# Patient Record
Sex: FEMALE | Race: WHITE | Employment: OTHER | URBAN - METROPOLITAN AREA
[De-identification: names, ages, dates, MRNs, and addresses within clinical notes are randomized per-mention and may not be internally consistent; named-entity substitution may affect disease eponyms.]

---

## 2017-01-09 ENCOUNTER — TRANSFERRED RECORDS (OUTPATIENT)
Dept: HEALTH INFORMATION MANAGEMENT | Facility: CLINIC | Age: 72
End: 2017-01-09

## 2017-02-22 ENCOUNTER — TRANSFERRED RECORDS (OUTPATIENT)
Dept: HEALTH INFORMATION MANAGEMENT | Facility: CLINIC | Age: 72
End: 2017-02-22

## 2017-02-22 LAB
ALT SERPL-CCNC: 31 U/L (ref 13–61)
AST SERPL-CCNC: 20 U/L (ref 5–37)
CHOLEST SERPL-MCNC: 122 MG/DL (ref 0–200)
CREAT SERPL-MCNC: 0.6 MG/DL (ref 0.6–1)
GFR SERPL CREATININE-BSD FRML MDRD: >=60 ML/MIN/1.73M2
GLUCOSE SERPL-MCNC: 104 MG/DL (ref 70–100)
HDLC SERPL-MCNC: 47 MG/DL (ref 40–60)
LDLC SERPL CALC-MCNC: 41 MG/DL (ref 0–99)
POTASSIUM SERPL-SCNC: 3.9 MMOL/L (ref 3.5–5.1)
TRIGL SERPL-MCNC: 168 MG/DL (ref 60–149)
TSH SERPL-ACNC: 2.99 UIU/ML (ref 0.36–4.7)

## 2017-03-01 ENCOUNTER — TRANSFERRED RECORDS (OUTPATIENT)
Dept: HEALTH INFORMATION MANAGEMENT | Facility: CLINIC | Age: 72
End: 2017-03-01

## 2017-06-01 ENCOUNTER — OFFICE VISIT (OUTPATIENT)
Dept: FAMILY MEDICINE | Facility: CLINIC | Age: 72
End: 2017-06-01
Payer: MEDICARE

## 2017-06-01 VITALS
HEIGHT: 63 IN | WEIGHT: 172 LBS | TEMPERATURE: 98 F | BODY MASS INDEX: 30.48 KG/M2 | HEART RATE: 62 BPM | SYSTOLIC BLOOD PRESSURE: 128 MMHG | OXYGEN SATURATION: 97 % | DIASTOLIC BLOOD PRESSURE: 75 MMHG

## 2017-06-01 DIAGNOSIS — I10 HYPERTENSION GOAL BP (BLOOD PRESSURE) < 140/90: ICD-10-CM

## 2017-06-01 DIAGNOSIS — J31.0 CHRONIC RHINITIS: ICD-10-CM

## 2017-06-01 DIAGNOSIS — F33.41 RECURRENT MAJOR DEPRESSION IN PARTIAL REMISSION (H): ICD-10-CM

## 2017-06-01 DIAGNOSIS — K21.9 GASTROESOPHAGEAL REFLUX DISEASE WITHOUT ESOPHAGITIS: ICD-10-CM

## 2017-06-01 DIAGNOSIS — E03.9 HYPOTHYROIDISM, UNSPECIFIED TYPE: ICD-10-CM

## 2017-06-01 DIAGNOSIS — Z00.00 MEDICARE ANNUAL WELLNESS VISIT, SUBSEQUENT: Primary | ICD-10-CM

## 2017-06-01 PROCEDURE — G0439 PPPS, SUBSEQ VISIT: HCPCS | Performed by: FAMILY MEDICINE

## 2017-06-01 PROCEDURE — 99213 OFFICE O/P EST LOW 20 MIN: CPT | Mod: 25 | Performed by: FAMILY MEDICINE

## 2017-06-01 RX ORDER — LEVOTHYROXINE SODIUM 100 UG/1
100 TABLET ORAL DAILY
Qty: 90 TABLET | Refills: 2 | Status: SHIPPED | OUTPATIENT
Start: 2017-06-01 | End: 2018-06-22

## 2017-06-01 RX ORDER — FLUTICASONE PROPIONATE 50 MCG
1-2 SPRAY, SUSPENSION (ML) NASAL DAILY
Qty: 16 G | Refills: 11 | Status: SHIPPED | OUTPATIENT
Start: 2017-06-01 | End: 2018-09-25

## 2017-06-01 RX ORDER — CITALOPRAM HYDROBROMIDE 20 MG/1
20 TABLET ORAL DAILY
Qty: 90 TABLET | Refills: 3 | Status: CANCELLED | OUTPATIENT
Start: 2017-06-01

## 2017-06-01 RX ORDER — CITALOPRAM HYDROBROMIDE 10 MG/1
10 TABLET ORAL DAILY
Qty: 90 TABLET | Refills: 3 | Status: SHIPPED | OUTPATIENT
Start: 2017-06-01 | End: 2018-06-12

## 2017-06-01 NOTE — MR AVS SNAPSHOT
After Visit Summary   6/1/2017    Miladis Singh    MRN: [id number]           Patient Information     Date Of Birth          1945        Visit Information        Provider Department      6/1/2017 11:20 AM Aman Salamanca MD [facility]        Today's Diagnoses     Medicare annual wellness visit, subsequent    -  1    Hypothyroidism, unspecified type        Recurrent Major Depression in Partial Remission-Yudy Deactivated        Hypertension goal BP (blood pressure) < 140/90        Gastroesophageal reflux disease without esophagitis          Care Instructions      Preventive Health Recommendations    Female Ages 65 +    Yearly exam:     See your health care provider every year in order to  o Review health changes.   o Discuss preventive care.    o Review your medicines if your doctor has prescribed any.      You no longer need a yearly Pap test unless you've had an abnormal Pap test in the past 10 years. If you have vaginal symptoms, such as bleeding or discharge, be sure to talk with your provider about a Pap test.      Every 1 to 2 years, have a mammogram.  If you are over 69, talk with your health care provider about whether or not you want to continue having screening mammograms.      Every 10 years, have a colonoscopy. Or, have a yearly FIT test (stool test). These exams will check for colon cancer.       Have a cholesterol test every 5 years, or more often if your doctor advises it.       Have a diabetes test (fasting glucose) every three years. If you are at risk for diabetes, you should have this test more often.       At age 65, have a bone density scan (DEXA) to check for osteoporosis (brittle bone disease).    Shots:    Get a flu shot each year.    Get a tetanus shot every 10 years.    Talk to your doctor about your pneumonia vaccines. There are now two you should receive - Pneumovax (PPSV 23) and Prevnar (PCV 13).    Talk to your doctor about the shingles  vaccine.    Talk to your doctor about the hepatitis B vaccine.    Nutrition:     Eat at least 5 servings of fruits and vegetables each day.      Eat whole-grain bread, whole-wheat pasta and brown rice instead of white grains and rice.      Talk to your provider about Calcium and Vitamin D.     Lifestyle    Exercise at least 150 minutes a week (30 minutes a day, 5 days a week). This will help you control your weight and prevent disease.      Limit alcohol to one drink per day.      No smoking.       Wear sunscreen to prevent skin cancer.       See your dentist twice a year for an exam and cleaning.      See your eye doctor every 1 to 2 years to screen for conditions such as glaucoma, macular degeneration and cataracts        If you want help to set up a health care directive. Set up an appt with Maureen Whelan.          Follow-ups after your visit        Your next 10 appointments already scheduled     Jun 01, 2017 11:20 AM CDT   PHYSICAL with Aman Salamanca MD   [facility]    [address] [place] [id number] [phone]              Who to contact     If you have questions or need follow up information about today's clinic visit or your schedule please contact [facility] directly at [phone].  Normal or non-critical lab and imaging results will be communicated to you by Ratifyhart, letter or phone within 4 business days after the clinic has received the results. If you do not hear from us within 7 days, please contact the clinic through Ratifyhart or phone. If you have a critical or abnormal lab result, we will notify you by phone as soon as possible.  Submit refill requests through [employer] or call your pharmacy and they will forward the refill request to us. Please allow 3 business days for your refill to be completed.          Additional Information About Your Visit        Frugalo Information     F[employer]alo gives you secure access to  "your electronic health record. If you see a primary care provider, you can also send messages to your care team and make appointments. If you have questions, please call your primary care clinic.  If you do not have a primary care provider, please call [phone] and they will assist you.        Care EveryWhere ID     This is your Care EveryWhere ID. This could be used by other organizations to access your [place] medical records  [id number]        Your Vitals Were     Pulse Temperature Height Pulse Oximetry BMI (Body Mass Index)       62 98  F (36.7  C) (Oral) 5' 2.5\" (1.588 m) 97% 30.96 kg/m2        Blood Pressure from Last 3 Encounters:   06/01/17 128/75   08/16/16 104/68   08/15/16 124/83    Weight from Last 3 Encounters:   06/01/17 172 lb (78 kg)   08/16/16 162 lb (73.5 kg)   08/15/16 164 lb (74.4 kg)              Today, you had the following     No orders found for display         Today's Medication Changes          These changes are accurate as of: 6/1/17 11:18 AM.  If you have any questions, ask your nurse or doctor.               These medicines have changed or have updated prescriptions.        Dose/Directions    * citalopram 20 MG tablet   Commonly known as:  celeXA   This may have changed:  Another medication with the same name was added. Make sure you understand how and when to take each.   Used for:  Recurrent major depression in partial remission (H)   Changed by:  Aman Salamanca MD        Dose:  20 mg   Take 1 tablet (20 mg) by mouth daily   Quantity:  90 tablet   Refills:  3       * citalopram 10 MG tablet   Commonly known as:  celeXA   This may have changed:  You were already taking a medication with the same name, and this prescription was added. Make sure you understand how and when to take each.   Used for:  Recurrent major depression in partial remission (H)   Changed by:  Aman Salamanca MD        Dose:  10 mg   Take 1 tablet (10 mg) by mouth daily   Quantity:  90 tablet   Refills:  " 3       omeprazole 20 MG CR capsule   Commonly known as:  priLOSEC   This may have changed:    - when to take this  - reasons to take this   Used for:  Gastroesophageal reflux disease without esophagitis   Changed by:  Aman Salamanca MD        Dose:  20 mg   Take 1 capsule (20 mg) by mouth 2 times daily as needed   Quantity:  180 capsule   Refills:  3       * Notice:  This list has 2 medication(s) that are the same as other medications prescribed for you. Read the directions carefully, and ask your doctor or other care provider to review them with you.         Where to get your medicines      [facility] [address] [place] [address] [address] AT [place] [address] [address], [facility] [address]     Phone:  [phone]     citalopram 10 MG tablet    levothyroxine 100 MCG tablet    omeprazole 20 MG CR capsule                Primary Care Provider Office Phone # Fax #    Aman Salamanca [phone]       [address] [address]        Thank you!     Thank you for choosing [facility]  for your care. Our goal is always to provide you with excellent care. Hearing back from our patients is one way we can continue to improve our services. Please take a few minutes to complete the written survey that you may receive in the mail after your visit with us. Thank you!             Your Updated Medication List - Protect others around you: Learn how to safely use, store and throw away your medicines at www.disposemymeds.org.          This list is accurate as of: 6/1/17 11:18 AM.  Always use your most recent med list.                   Brand Name Dispense Instructions for use    atorvastatin 80 MG tablet    LIPITOR     Take 80 mg by mouth daily.       DOTTIE LOW STRENGTH 81 MG EC tablet   Generic drug:  aspirin      Take 81 mg by mouth daily.       chlorthalidone 25 MG tablet    HYGROTON     Take 25  mg by mouth daily.       * citalopram 20 MG tablet    celeXA    90 tablet    Take 1 tablet (20 mg) by mouth daily       * citalopram 10 MG tablet    celeXA    90 tablet    Take 1 tablet (10 mg) by mouth daily       conjugated estrogens cream    PREMARIN     Place 1 g vaginally three times a week       levothyroxine 100 MCG tablet    SYNTHROID/LEVOTHROID    90 tablet    Take 1 tablet (100 mcg) by mouth daily       losartan 100 MG tablet    COZAAR     Take 100 mg by mouth daily.       omeprazole 20 MG CR capsule    priLOSEC    180 capsule    Take 1 capsule (20 mg) by mouth 2 times daily as needed       potassium chloride 20 MEQ Packet    KLOR-CON     Take 20 mEq by mouth daily.       TGT DAILY MULTIVITAMIN WOMENS PO      Take  by mouth.       VIACTIV 500-100-40 chewable tablet   Generic drug:  calcium-vitamin D-vitamin k      Take 1 chew tab by mouth 2 times daily.       VITAMIN D (CHOLECALCIFEROL) PO      Take 2,000 Units by mouth daily       * Notice:  This list has 2 medication(s) that are the same as other medications prescribed for you. Read the directions carefully, and ask your doctor or other care provider to review them with you.

## 2017-06-01 NOTE — NURSING NOTE
"Chief Complaint   Patient presents with     Wellness Visit     Ear Problem     right ear pain and itching.        Initial /75  Pulse 62  Temp 98  F (36.7  C) (Oral)  Ht 5' 2.5\" (1.588 m)  Wt 172 lb (78 kg)  SpO2 97%  BMI 30.96 kg/m2 Estimated body mass index is 30.96 kg/(m^2) as calculated from the following:    Height as of this encounter: 5' 2.5\" (1.588 m).    Weight as of this encounter: 172 lb (78 kg).  Medication Reconciliation: complete     Yasir Richardson MA       "

## 2017-06-01 NOTE — Clinical Note
Please abstract the following data from this visit with this patient into the appropriate field in Epic:  Eye exam with ophthalmology on this date: 09/2016

## 2017-06-01 NOTE — PATIENT INSTRUCTIONS

## 2017-06-02 ASSESSMENT — PATIENT HEALTH QUESTIONNAIRE - PHQ9: SUM OF ALL RESPONSES TO PHQ QUESTIONS 1-9: 1

## 2017-09-07 ENCOUNTER — ALLIED HEALTH/NURSE VISIT (OUTPATIENT)
Dept: NURSING | Facility: CLINIC | Age: 72
End: 2017-09-07
Payer: MEDICARE

## 2017-09-07 DIAGNOSIS — Z23 NEED FOR PROPHYLACTIC VACCINATION AND INOCULATION AGAINST INFLUENZA: Primary | ICD-10-CM

## 2017-09-07 PROCEDURE — G0008 ADMIN INFLUENZA VIRUS VAC: HCPCS

## 2017-09-07 PROCEDURE — 99207 ZZC NO CHARGE NURSE ONLY: CPT

## 2017-09-07 PROCEDURE — 90662 IIV NO PRSV INCREASED AG IM: CPT

## 2017-09-07 NOTE — MR AVS SNAPSHOT
After Visit Summary   9/7/2017    Miladis Singh    MRN: [id number]           Patient Information     Date Of Birth          1945        Visit Information        Provider Department      9/7/2017 1:00 PM [facility]        Today's Diagnoses     Need for prophylactic vaccination and inoculation against influenza    -  1       Follow-ups after your visit        Who to contact     If you have questions or need follow up information about today's clinic visit or your schedule please contact [facility] directly at [phone].  Normal or non-critical lab and imaging results will be communicated to you by [employer] For Homehart, letter or phone within 4 business days after the clinic has received the results. If you do not hear from us within 7 days, please contact the clinic through [employer] or phone. If you have a critical or abnormal lab result, we will notify you by phone as soon as possible.  Submit refill requests through [employer] or call your pharmacy and they will forward the refill request to us. Please allow 3 business days for your refill to be completed.          Additional Information About Your Visit        MyChart Information     [employer] gives you secure access to your electronic health record. If you see a primary care provider, you can also send messages to your care team and make appointments. If you have questions, please call your primary care clinic.  If you do not have a primary care provider, please call [phone] and they will assist you.        Care EveryWhere ID     This is your Care EveryWhere ID. This could be used by other organizations to access your [place] medical records  [id number]         Blood Pressure from Last 3 Encounters:   06/01/17 128/75   08/16/16 104/68   08/15/16 124/83    Weight from Last 3 Encounters:   06/01/17 172 lb (78 kg)   08/16/16 162 lb (73.5 kg)   08/15/16 164 lb (74.4 kg)              We Performed  the Following     FLU VACCINE, INCREASED ANTIGEN, PRESV FREE, AGE 65+ [88834]     Vaccine Administration, Initial [[id number]]        Primary Care Provider Office Phone # Fax #    Aman Salamanca [phone] [address]  [facility] [address]        Equal Access to Services     RANULFO AVERY : Hadii aad ku hadasho Soomaali, waaxda luqadaha, qaybta kaalmada adeegyada, waxay vladimirin hayaan adeeduard germaniaventura neely. So [facility] [phone].    ATENCIÓN: Si habla español, tiene a alaniz disposición servicios gratuitos de asistencia lingüística. Llame al [phone].    We comply with applicable federal civil rights laws and [place] laws. We do not discriminate on the basis of race, color, national origin, age, disability sex, sexual orientation or gender identity.            Thank you!     Thank you for choosing [facility]  for your care. Our goal is always to provide you with excellent care. Hearing back from our patients is one way we can continue to improve our services. Please take a few minutes to complete the written survey that you may receive in the mail after your visit with us. Thank you!             Your Updated Medication List - Protect others around you: Learn how to safely use, store and throw away your medicines at www.disposemymeds.org.          This list is accurate as of: 9/7/17  1:32 PM.  Always use your most recent med list.                   Brand Name Dispense Instructions for use Diagnosis    atorvastatin 80 MG tablet    LIPITOR     Take 80 mg by mouth daily.        DOTTIE LOW STRENGTH 81 MG EC tablet   Generic drug:  aspirin      Take 81 mg by mouth daily.        chlorthalidone 25 MG tablet    HYGROTON     Take 25 mg by mouth daily.        * citalopram 20 MG tablet    celeXA    90 tablet    Take 1 tablet (20 mg) by mouth daily    Recurrent major depression in partial remission (H)       * citalopram 10 MG tablet    celeXA    90 tablet    Take 1 tablet (10 mg) by mouth  daily    Recurrent major depression in partial remission (H)       conjugated estrogens cream    PREMARIN     Place 1 g vaginally three times a week        fluticasone 50 MCG/ACT spray    FLONASE    16 g    Spray 1-2 sprays into both nostrils daily    Chronic rhinitis       levothyroxine 100 MCG tablet    SYNTHROID/LEVOTHROID    90 tablet    Take 1 tablet (100 mcg) by mouth daily    Hypothyroidism, unspecified type       losartan 100 MG tablet    COZAAR     Take 100 mg by mouth daily.        omeprazole 20 MG CR capsule    priLOSEC    180 capsule    Take 1 capsule (20 mg) by mouth 2 times daily as needed    Gastroesophageal reflux disease without esophagitis       potassium chloride 20 MEQ Packet    KLOR-CON     Take 20 mEq by mouth daily.        TGT DAILY MULTIVITAMIN WOMENS PO      Take  by mouth.        VIACTIV 500-100-40 chewable tablet   Generic drug:  calcium-vitamin D-vitamin k      Take 1 chew tab by mouth 2 times daily.        VITAMIN D (CHOLECALCIFEROL) PO      Take 2,000 Units by mouth daily        * Notice:  This list has 2 medication(s) that are the same as other medications prescribed for you. Read the directions carefully, and ask your doctor or other care provider to review them with you.

## 2017-09-07 NOTE — PROGRESS NOTES
Injectable Influenza Immunization Documentation    1.  Are you sick today? (Fever of 100.5 or higher on the day of the clinic)   No    2.  Have you ever had Guillain-[place] Syndrome within 6 weeks of an influenza vaccionation?  No    3. Do you have a life-threatening allergy to eggs?  No    4. Do you have a life-threatening allergy to a component of the vaccine? May include antibiotics, gelatin or latex.  No     5. Have you ever had a reaction to a dose of flu vaccine that needed immediate medical attention?  No     Form completed by Chato Quevedo MA

## 2017-12-04 ENCOUNTER — TRANSFERRED RECORDS (OUTPATIENT)
Dept: HEALTH INFORMATION MANAGEMENT | Facility: CLINIC | Age: 72
End: 2017-12-04

## 2018-03-05 ENCOUNTER — TRANSFERRED RECORDS (OUTPATIENT)
Dept: HEALTH INFORMATION MANAGEMENT | Facility: CLINIC | Age: 73
End: 2018-03-05

## 2018-03-05 LAB
ALT SERPL-CCNC: 37 U/L (ref 13–61)
AST SERPL-CCNC: 25 U/L (ref 15–37)
CHOLEST SERPL-MCNC: 119 MG/DL (ref 0–200)
CREAT SERPL-MCNC: 0.63 MG/DL (ref 0.55–1.02)
GFR SERPL CREATININE-BSD FRML MDRD: 88.65 ML/MIN/1.73M2
GLUCOSE SERPL-MCNC: 106 MG/DL (ref 70–106)
HDLC SERPL-MCNC: 44 MG/DL (ref 40–60)
LDLC SERPL CALC-MCNC: 49 MG/DL (ref 0–99)
NONHDLC SERPL-MCNC: 75 MG/DL (ref 0–129)
POTASSIUM SERPL-SCNC: 3.9 MMOL/L (ref 3.5–5.1)
TRIGL SERPL-MCNC: 130 MG/DL (ref 60–149)
TSH SERPL-ACNC: 3.56 UIU/ML (ref 0.36–3.74)

## 2018-06-12 DIAGNOSIS — F33.41 RECURRENT MAJOR DEPRESSION IN PARTIAL REMISSION (H): ICD-10-CM

## 2018-06-12 NOTE — TELEPHONE ENCOUNTER
"Requested Prescriptions   Pending Prescriptions Disp Refills     citalopram (CELEXA) 10 MG tablet  Last Written Prescription Date:  6-1-17  Last Fill Quantity: 90 tab,  # refills: 3   Last office visit: 6/1/2017 with prescribing provider:  Aman Salamanca    Future Office Visit:   90 tablet 3     Sig: Take 1 tablet (10 mg) by mouth daily    SSRIs Protocol Failed    6/12/2018  1:48 PM       Failed - PHQ-9 score less than 5 in past 6 months    Please review last PHQ-9 score.   PHQ-9 SCORE 3/15/2016 8/15/2016 6/1/2017   Total Score - - -   Total Score MyChart - - -   Total Score 1 5 1     No flowsheet data found.       Failed - Recent (6 mo) or future (30 days) visit within the authorizing provider's specialty    Patient had office visit in the last 6 months or has a visit in the next 30 days with authorizing provider or within the authorizing provider's specialty.  See \"Patient Info\" tab in inbasket, or \"Choose Columns\" in Meds & Orders section of the refill encounter.           Passed - Patient is age 18 or older       Passed - No active pregnancy on record       Passed - No positive pregnancy test in last 12 months          "

## 2018-06-14 RX ORDER — CITALOPRAM HYDROBROMIDE 10 MG/1
10 TABLET ORAL DAILY
Qty: 30 TABLET | Refills: 0 | Status: SHIPPED | OUTPATIENT
Start: 2018-06-14 | End: 2018-06-22

## 2018-06-14 NOTE — TELEPHONE ENCOUNTER
,    Pt does not read her mycharts. Please call her . She needs appt for this refill. Hasn't been seen since 6/1/17            Thank you,  Mary Coyle RN

## 2018-06-14 NOTE — TELEPHONE ENCOUNTER
Medication is being filled for 1 time refill only due to:  Patient needs to be seen because it has been more than one year since last visit.     Signed Prescriptions:                        Disp   Refills    citalopram (CELEXA) 10 MG tablet           30 tab*0        Sig: Take 1 tablet (10 mg) by mouth daily  Authorizing Provider: ELIAS DYSON  Ordering User: DEMETRIO JO      Closing encounter - no further actions needed at this time    Demetrio Jo RN

## 2018-06-22 ENCOUNTER — RADIANT APPOINTMENT (OUTPATIENT)
Dept: GENERAL RADIOLOGY | Facility: CLINIC | Age: 73
End: 2018-06-22
Attending: FAMILY MEDICINE
Payer: MEDICARE

## 2018-06-22 ENCOUNTER — OFFICE VISIT (OUTPATIENT)
Dept: FAMILY MEDICINE | Facility: CLINIC | Age: 73
End: 2018-06-22
Payer: MEDICARE

## 2018-06-22 VITALS
TEMPERATURE: 97.6 F | WEIGHT: 167.4 LBS | SYSTOLIC BLOOD PRESSURE: 133 MMHG | OXYGEN SATURATION: 95 % | DIASTOLIC BLOOD PRESSURE: 77 MMHG | HEART RATE: 68 BPM | BODY MASS INDEX: 30.13 KG/M2 | RESPIRATION RATE: 16 BRPM

## 2018-06-22 DIAGNOSIS — N95.2 ATROPHIC VAGINITIS: ICD-10-CM

## 2018-06-22 DIAGNOSIS — M25.552 BILATERAL HIP PAIN: ICD-10-CM

## 2018-06-22 DIAGNOSIS — E03.9 HYPOTHYROIDISM, UNSPECIFIED TYPE: ICD-10-CM

## 2018-06-22 DIAGNOSIS — M25.551 BILATERAL HIP PAIN: ICD-10-CM

## 2018-06-22 DIAGNOSIS — M25.552 BILATERAL HIP PAIN: Primary | ICD-10-CM

## 2018-06-22 DIAGNOSIS — Z87.440 H/O RECURRENT URINARY TRACT INFECTION: ICD-10-CM

## 2018-06-22 DIAGNOSIS — F33.41 RECURRENT MAJOR DEPRESSION IN PARTIAL REMISSION (H): ICD-10-CM

## 2018-06-22 DIAGNOSIS — M25.551 BILATERAL HIP PAIN: Primary | ICD-10-CM

## 2018-06-22 PROCEDURE — 72100 X-RAY EXAM L-S SPINE 2/3 VWS: CPT

## 2018-06-22 PROCEDURE — 99214 OFFICE O/P EST MOD 30 MIN: CPT | Performed by: FAMILY MEDICINE

## 2018-06-22 PROCEDURE — 72170 X-RAY EXAM OF PELVIS: CPT

## 2018-06-22 RX ORDER — SULFAMETHOXAZOLE/TRIMETHOPRIM 800-160 MG
1 TABLET ORAL 2 TIMES DAILY
Qty: 6 TABLET | Refills: 3 | Status: SHIPPED | OUTPATIENT
Start: 2018-06-22 | End: 2019-12-17

## 2018-06-22 RX ORDER — CITALOPRAM HYDROBROMIDE 10 MG/1
10 TABLET ORAL DAILY
Qty: 90 TABLET | Refills: 3 | Status: SHIPPED | OUTPATIENT
Start: 2018-06-22 | End: 2019-05-16

## 2018-06-22 RX ORDER — LEVOTHYROXINE SODIUM 100 UG/1
100 TABLET ORAL DAILY
Qty: 90 TABLET | Refills: 3 | Status: SHIPPED | OUTPATIENT
Start: 2018-06-22 | End: 2019-05-20

## 2018-06-22 ASSESSMENT — PATIENT HEALTH QUESTIONNAIRE - PHQ9: 5. POOR APPETITE OR OVEREATING: NOT AT ALL

## 2018-06-22 ASSESSMENT — ANXIETY QUESTIONNAIRES
5. BEING SO RESTLESS THAT IT IS HARD TO SIT STILL: NOT AT ALL
GAD7 TOTAL SCORE: 2
6. BECOMING EASILY ANNOYED OR IRRITABLE: NOT AT ALL
2. NOT BEING ABLE TO STOP OR CONTROL WORRYING: SEVERAL DAYS
7. FEELING AFRAID AS IF SOMETHING AWFUL MIGHT HAPPEN: NOT AT ALL
3. WORRYING TOO MUCH ABOUT DIFFERENT THINGS: SEVERAL DAYS
IF YOU CHECKED OFF ANY PROBLEMS ON THIS QUESTIONNAIRE, HOW DIFFICULT HAVE THESE PROBLEMS MADE IT FOR YOU TO DO YOUR WORK, TAKE CARE OF THINGS AT HOME, OR GET ALONG WITH OTHER PEOPLE: NOT DIFFICULT AT ALL
1. FEELING NERVOUS, ANXIOUS, OR ON EDGE: NOT AT ALL

## 2018-06-22 NOTE — PROGRESS NOTES
SUBJECTIVE:   Miladis Singh is a 72 year old female who presents to clinic today for the following multiple health issues:    1: anxiety/depression-her mood has been stable on the celexa. We reduced the dose some time agon. May a bit more anxious but overall doing ok.     2: hip/back pain. Pain in her groin with walking. Worse when she goes down steps. Still in the am but that is very short lived. occ pain in the lower back SI area. Had some pain more in the back about 7 years ago treated with an injection. MRI done at that time was reviewed. occ pain radiates down to the knees. No numbness/tingling. No injury nor overuse but does walk quite a bit.     3: h/o UTIs/atrophic vaginaitis. Refill of premarin needed. She also getssome antibiotics from her uroligst she uses if symptomatic with uti. Would freedom me to prescribe these. Usually had been given cipro. Uses them about every 3 months or so.     4: hypothyroidism-had tsh done. Just needs refill of meds. Feeling no symptoms she has attributed to her thyroid.     med hx, family hx, and soc hx reviewed and updated     OBJECTIVE: /77  Pulse 68  Temp 97.6  F (36.4  C) (Oral)  Resp 16  Wt 167 lb 6.4 oz (75.9 kg)  SpO2 95%  BMI 30.13 kg/m2 Exam:  GENERAL APPEARANCE: healthy, alert and no distress  EYES: Eyes grossly normal to inspection  HENT: ear canals and TM's normal and nose and mouth without ulcers or lesions  RESP: lungs clear to auscultation - no rales, rhonchi or wheezes  CV: regular rates and rhythm, normal S1 S2, no S3 or S4 and no murmur, click or rub -  ABDOMEN:  soft, nontender, no HSM or masses and bowel sounds normal  MS: her back has good range of motion. There is some increased pain with hipl flexion but not with straight leg raises. Mild tenderness about the si joint and over the greater trochanter but this is not the pain she has while walking. Hip range of motion is pretty normal and not tender.   PSYCH: mentation appears normal and  affect normal/bright     Hip xray show minimal djd changes.       ICD-10-CM    1. Bilateral hip pain M25.551 XR Pelvis 1/2 Views    M25.552 XR Lumbar Spine 2/3 Views   2. Recurrent Major Depression in Partial Remission-Yudy Deactivated F33.41 citalopram (CELEXA) 10 MG tablet   3. Hypothyroidism, unspecified type E03.9 levothyroxine (SYNTHROID/LEVOTHROID) 100 MCG tablet   4. Atrophic vaginitis N95.2 conjugated estrogens (PREMARIN) cream   5. H/O recurrent urinary tract infection Z87.440 sulfamethoxazole-trimethoprim (BACTRIM DS/SEPTRA DS) 800-160 MG per tablet    her pain seems most consistent with hip djd. No sign inflammaotory arthritis. discused options. She decided to work on strengthening and follow up if it persists or worsens. Refilled premary and thyroi. Will have her use bacrim rathe than the cipro for the utis. follow up if it is not helpful in 2-3 days. Refilled celexa at the same dose.

## 2018-06-22 NOTE — MR AVS SNAPSHOT
After Visit Summary   6/22/2018    Miladis Singh    MRN: [id number]           Patient Information     Date Of Birth          1945        Visit Information        Provider Department      6/22/2018 2:40 PM Aman Salamanca MD [facility]        Today's Diagnoses     Bilateral hip pain    -  1    Recurrent Major Depression in Partial Remission-Yudy Deactivated        Hypothyroidism, unspecified type        Atrophic vaginitis        H/O recurrent urinary tract infection           Follow-ups after your visit        Who to contact     If you have questions or need follow up information about today's clinic visit or your schedule please contact [facility] directly at [phone].  Normal or non-critical lab and imaging results will be communicated to you by MyChart, letter or phone within 4 business days after the clinic has received the results. If you do not hear from us within 7 days, please contact the clinic through [facility] or phone. If you have a critical or abnormal lab result, we will notify you by phone as soon as possible.  Submit refill requests through [employer] or call your pharmacy and they will forward the refill request to us. Please allow 3 business days for your refill to be completed.          Additional Information About Your Visit        MyChart Information     [employer] gives you secure access to your electronic health record. If you see a primary care provider, you can also send messages to your care team and make appointments. If you have questions, please call your primary care clinic.  If you do not have a primary care provider, please call [phone] and they will assist you.        Care EveryWhere ID     This is your Care EveryWhere ID. This could be used by other organizations to access your [facility] medical records  [id number]        Your Vitals Were     Pulse Temperature Respirations Pulse Oximetry BMI (Body Mass Index)        68 97.6  F (36.4  C) (Oral) 16 95% 30.13 kg/m2        Blood Pressure from Last 3 Encounters:   06/22/18 133/77   06/01/17 128/75   08/16/16 104/68    Weight from Last 3 Encounters:   06/22/18 167 lb 6.4 oz (75.9 kg)   06/01/17 172 lb (78 kg)   08/16/16 162 lb (73.5 kg)                 Today's Medication Changes          These changes are accurate as of 6/22/18  3:43 PM.  If you have any questions, ask your nurse or doctor.               Start taking these medicines.        Dose/Directions    sulfamethoxazole-trimethoprim 800-160 MG per tablet   Commonly known as:  BACTRIM DS/SEPTRA DS   Used for:  H/O recurrent urinary tract infection   Started by:  Aman Salamanca MD        Dose:  1 tablet   Take 1 tablet by mouth 2 times daily for 3 days   Quantity:  6 tablet   Refills:  3         These medicines have changed or have updated prescriptions.        Dose/Directions    citalopram 10 MG tablet   Commonly known as:  celeXA   This may have changed:  Another medication with the same name was removed. Continue taking this medication, and follow the directions you see here.   Used for:  Recurrent major depression in partial remission (H)   Changed by:  Aman Salamanca MD        Dose:  10 mg   Take 1 tablet (10 mg) by mouth daily   Quantity:  90 tablet   Refills:  3            Where to get your medicines      These medications were sent to [facility] [address] [place] [address] [address] AT [place] [address] [address], [facility] [address]     Phone:  [phone]     citalopram 10 MG tablet    conjugated estrogens cream    levothyroxine 100 MCG tablet    sulfamethoxazole-trimethoprim 800-160 MG per tablet                Primary Care Provider Office Phone # Fax #    Aman Salamanca [phone]       [address] [place] [address]        Equal Access to Services     RANULFO AVERY AH: Gretchen abernathy Soswapnil, waaxda luqadaha, qaybta kaalcindi frances  paula jayceeeduard neymarronda solitario ah. So [facility] [phone].    ATENCIÓN: Si woody blancas, tiene a alaniz disposición servicios gratuitos de asistencia lingüística. Federico willis [phone].    We comply with applicable federal civil rights laws and [place] laws. We do not discriminate on the basis of race, color, national origin, age, disability, sex, sexual orientation, or gender identity.            Thank you!     Thank you for choosing [facility]  for your care. Our goal is always to provide you with excellent care. Hearing back from our patients is one way we can continue to improve our services. Please take a few minutes to complete the written survey that you may receive in the mail after your visit with us. Thank you!             Your Updated Medication List - Protect others around you: Learn how to safely use, store and throw away your medicines at www.disposemymeds.org.          This list is accurate as of 6/22/18  3:43 PM.  Always use your most recent med list.                   Brand Name Dispense Instructions for use Diagnosis    atorvastatin 80 MG tablet    LIPITOR     Take 80 mg by mouth daily.        DOTTIE LOW STRENGTH 81 MG EC tablet   Generic drug:  aspirin      Take 81 mg by mouth daily.        chlorthalidone 25 MG tablet    HYGROTON     Take 25 mg by mouth daily.        citalopram 10 MG tablet    celeXA    90 tablet    Take 1 tablet (10 mg) by mouth daily    Recurrent major depression in partial remission (H)       conjugated estrogens cream    PREMARIN    30 g    Place 1 g vaginally three times a week    Atrophic vaginitis       fluticasone 50 MCG/ACT spray    FLONASE    16 g    Spray 1-2 sprays into both nostrils daily    Chronic rhinitis       levothyroxine 100 MCG tablet    SYNTHROID/LEVOTHROID    90 tablet    Take 1 tablet (100 mcg) by mouth daily    Hypothyroidism, unspecified type       losartan 100 MG tablet    COZAAR     Take 100 mg by mouth daily.        omeprazole 20 MG CR  capsule    priLOSEC    180 capsule    Take 1 capsule (20 mg) by mouth 2 times daily as needed    Gastroesophageal reflux disease without esophagitis       potassium chloride 20 MEQ Packet    KLOR-CON     Take 20 mEq by mouth daily.        sulfamethoxazole-trimethoprim 800-160 MG per tablet    BACTRIM DS/SEPTRA DS    6 tablet    Take 1 tablet by mouth 2 times daily for 3 days    H/O recurrent urinary tract infection       TGT DAILY MULTIVITAMIN WOMENS PO      Take  by mouth.        VIACTIV 500-100-40 chewable tablet   Generic drug:  calcium-vitamin D-vitamin k      Take 1 chew tab by mouth 2 times daily.        VITAMIN D (CHOLECALCIFEROL) PO      Take 2,000 Units by mouth daily

## 2018-06-23 ASSESSMENT — ANXIETY QUESTIONNAIRES: GAD7 TOTAL SCORE: 2

## 2018-06-23 ASSESSMENT — PATIENT HEALTH QUESTIONNAIRE - PHQ9: SUM OF ALL RESPONSES TO PHQ QUESTIONS 1-9: 2

## 2018-06-25 ENCOUNTER — TELEPHONE (OUTPATIENT)
Dept: FAMILY MEDICINE | Facility: CLINIC | Age: 73
End: 2018-06-25

## 2018-06-25 DIAGNOSIS — R93.89 ABNORMAL X-RAY: Primary | ICD-10-CM

## 2018-06-25 NOTE — TELEPHONE ENCOUNTER
Informed patient and now will have Neela in Xray call patient to line up an appt time. She is available to come Tuesday or Weds this week.  Neela Chacon RN

## 2018-06-26 DIAGNOSIS — R93.89 ABNORMAL X-RAY: ICD-10-CM

## 2018-06-26 PROCEDURE — 73552 X-RAY EXAM OF FEMUR 2/>: CPT | Mod: LT

## 2018-07-10 ENCOUNTER — OFFICE VISIT (OUTPATIENT)
Dept: FAMILY MEDICINE | Facility: CLINIC | Age: 73
End: 2018-07-10
Payer: MEDICARE

## 2018-07-10 VITALS
HEIGHT: 61 IN | HEART RATE: 75 BPM | SYSTOLIC BLOOD PRESSURE: 128 MMHG | WEIGHT: 165 LBS | TEMPERATURE: 98 F | OXYGEN SATURATION: 96 % | BODY MASS INDEX: 31.15 KG/M2 | DIASTOLIC BLOOD PRESSURE: 72 MMHG

## 2018-07-10 DIAGNOSIS — R59.9 LYMPH NODE ENLARGEMENT: ICD-10-CM

## 2018-07-10 DIAGNOSIS — H69.92 DYSFUNCTION OF LEFT EUSTACHIAN TUBE: Primary | ICD-10-CM

## 2018-07-10 PROCEDURE — 99213 OFFICE O/P EST LOW 20 MIN: CPT | Performed by: INTERNAL MEDICINE

## 2018-07-10 RX ORDER — FLUTICASONE PROPIONATE 50 MCG
1-2 SPRAY, SUSPENSION (ML) NASAL DAILY
Qty: 1 BOTTLE | Refills: 0 | Status: SHIPPED | OUTPATIENT
Start: 2018-07-10 | End: 2018-09-25

## 2018-07-10 ASSESSMENT — ENCOUNTER SYMPTOMS
RHINORRHEA: 0
FEVER: 0
SORE THROAT: 0
COUGH: 0
CHILLS: 0

## 2018-07-10 NOTE — PATIENT INSTRUCTIONS
1.  Ice   & ibuprofen 3 x day with food  No hard chewing food.  Jaw rest.    If not improved over the next week, consider oral antibiotics      2. flonase few weeks for eustachian tube symptoms - if this is not better, would see ENT

## 2018-07-10 NOTE — LETTER
My Depression Action Plan  Name: Miladis Singh   Date of Birth 1945  Date: 7/10/2018    My doctor: Aman Salamanca   My clinic: [address] [id number] [phone]          GREEN    ZONE   Good Control    What it looks like:     Things are going generally well. You have normal up s and down s. You may even feel depressed from time to time, but bad moods usually last less than a day.   What you need to do:  1. Continue to care for yourself (see self care plan)  2. Check your depression survival kit and update it as needed  3. Follow your physician s recommendations including any medication.  4. Do not stop taking medication unless you consult with your physician first.           YELLOW         ZONE Getting Worse    What it looks like:     Depression is starting to interfere with your life.     It may be hard to get out of bed; you may be starting to isolate yourself from others.    Symptoms of depression are starting to last most all day and this has happened for several days.     You may have suicidal thoughts but they are not constant.   What you need to do:     1. Call your care team, your response to treatment will improve if you keep your care team informed of your progress. Yellow periods are signs an adjustment may need to be made.     2. Continue your self-care, even if you have to fake it!    3. Talk to someone in your support network    4. Open up your depression survival kit           RED    ZONE Medical Alert - Get Help    What it looks like:     Depression is seriously interfering with your life.     You may experience these or other symptoms: You can t get out of bed most days, can t work or engage in other necessary activities, you have trouble taking care of basic hygiene, or basic responsibilities, thoughts of suicide or death that will not go away, self-injurious behavior.     What you need to do:  1. Call your care team and  request a same-day appointment. If they are not available (weekends or after hours) call your local crisis line, emergency room or 911.            Depression Self Care Plan / Survival Kit    Self-Care for Depression  Here s the deal. Your body and mind are really not as separate as most people think.  What you do and think affects how you feel and how you feel influences what you do and think. This means if you do things that people who feel good do, it will help you feel better.  Sometimes this is all it takes.  There is also a place for medication and therapy depending on how severe your depression is, so be sure to consult with your medical provider and/ or Behavioral Health Consultant if your symptoms are worsening or not improving.     In order to better manage my stress, I will:    Exercise  Get some form of exercise, every day. This will help reduce pain and release endorphins, the  feel good  chemicals in your brain. This is almost as good as taking antidepressants!  This is not the same as joining a gym and then never going! (they count on that by the way ) It can be as simple as just going for a walk or doing some gardening, anything that will get you moving.      Hygiene   Maintain good hygiene (Get out of bed in the morning, Make your bed, Brush your teeth, Take a shower, and Get dressed like you were going to work, even if you are unemployed).  If your clothes don't fit try to get ones that do.    Diet  I will strive to eat foods that are good for me, drink plenty of water, and avoid excessive sugar, caffeine, alcohol, and other mood-altering substances.  Some foods that are helpful in depression are: complex carbohydrates, B vitamins, flaxseed, fish or fish oil, fresh fruits and vegetables.    Psychotherapy  I agree to participate in Individual Therapy (if recommended).    Medication  If prescribed medications, I agree to take them.  Missing doses can result in serious side effects.  I understand that  drinking alcohol, or other illicit drug use, may cause potential side effects.  I will not stop my medication abruptly without first discussing it with my provider.    Staying Connected With Others  I will stay in touch with my friends, family members, and my primary care provider/team.    Use your imagination  Be creative.  We all have a creative side; it doesn t matter if it s oil painting, sand castles, or mud pies! This will also kick up the endorphins.    Witness Beauty  (AKA stop and smell the roses) Take a look outside, even in mid-winter. Notice colors, textures. Watch the squirrels and birds.     Service to others  Be of service to others.  There is always someone else in need.  By helping others we can  get out of ourselves  and remember the really important things.  This also provides opportunities for practicing all the other parts of the program.    Humor  Laugh and be silly!  Adjust your TV habits for less news and crime-drama and more comedy.    Control your stress  Try breathing deep, massage therapy, biofeedback, and meditation. Find time to relax each day.     My support system    Clinic Contact:  Phone number:    Contact 1:  Phone number:    Contact 2:  Phone number:    Hindu/:  Phone number:    Therapist:  Phone number:    Local crisis center:    Phone number:    Other community support:  Phone number:

## 2018-07-10 NOTE — MR AVS SNAPSHOT
After Visit Summary   7/10/2018    Miladis Singh    MRN: [id number]           Patient Information     Date Of Birth          1945        Visit Information        Provider Department      7/10/2018 4:00 PM Gina Tafoya MD [facility]        Today's Diagnoses     Dysfunction of left eustachian tube    -  1    Lymph node enlargement          Care Instructions    1.  Ice   & ibuprofen 3 x day with food  No hard chewing food.  Jaw rest.    If not improved over the next week, consider oral antibiotics      2. flonase few weeks for eustachian tube symptoms - if this is not better, would see ENT          Follow-ups after your visit        Who to contact     If you have questions or need follow up information about today's clinic visit or your schedule please contact [facility] directly at [phone].  Normal or non-critical lab and imaging results will be communicated to you by [facility], letter or phone within 4 business days after the clinic has received the results. If you do not hear from us within 7 days, please contact the clinic through [facility] or phone. If you have a critical or abnormal lab result, we will notify you by phone as soon as possible.  Submit refill requests through [employer] or call your pharmacy and they will forward the refill request to us. Please allow 3 business days for your refill to be completed.          Additional Information About Your Visit        MyChart Information     [employer] gives you secure access to your electronic health record. If you see a primary care provider, you can also send messages to your care team and make appointments. If you have questions, please call your primary care clinic.  If you do not have a primary care provider, please call [phone] and they will assist you.        Care EveryWhere ID     This is your Care EveryWhere ID. This could be used by other organizations to access your [facility]  "medical records  [id number]        Your Vitals Were     Pulse Temperature Height Pulse Oximetry BMI (Body Mass Index)       75 98  F (36.7  C) (Oral) 5' 0.75\" (1.543 m) 96% 31.43 kg/m2        Blood Pressure from Last 3 Encounters:   07/10/18 128/72   06/22/18 133/77   06/01/17 128/75    Weight from Last 3 Encounters:   07/10/18 165 lb (74.8 kg)   06/22/18 167 lb 6.4 oz (75.9 kg)   06/01/17 172 lb (78 kg)              Today, you had the following     No orders found for display         Today's Medication Changes          These changes are accurate as of 7/10/18  4:38 PM.  If you have any questions, ask your nurse or doctor.               These medicines have changed or have updated prescriptions.        Dose/Directions    * fluticasone 50 MCG/ACT spray   Commonly known as:  FLONASE   This may have changed:  Another medication with the same name was added. Make sure you understand how and when to take each.   Used for:  Chronic rhinitis   Changed by:  Gina Tafoya MD        Dose:  1-2 spray   Spray 1-2 sprays into both nostrils daily   Quantity:  16 g   Refills:  11       * fluticasone 50 MCG/ACT spray   Commonly known as:  FLONASE   This may have changed:  You were already taking a medication with the same name, and this prescription was added. Make sure you understand how and when to take each.   Used for:  Dysfunction of left eustachian tube   Changed by:  Gina Tafoya MD        Dose:  1-2 spray   Spray 1-2 sprays into both nostrils daily   Quantity:  1 Bottle   Refills:  0       * Notice:  This list has 2 medication(s) that are the same as other medications prescribed for you. Read the directions carefully, and ask your doctor or other care provider to review them with you.         Where to get your medicines      These medications were sent to [facility] [address] [place] [address] [address] [place] [address] [address], [facility] [address]     Phone:  " [phone]     fluticasone 50 MCG/ACT spray                Primary Care Provider Office Phone # Fax #    Aman Salamanca [phone] [address] [facility] [address]        Equal Access to Services     MANDIHYUN GENA : Hadii tristen ku hadabdullahio Soomaali, waaxda luqadaha, qaybta kaalmada adeegyada, waxmelony idiin ambrosen adeeduard salazar kassi neely. So [facility] [phone].    ATENCIÓN: Si habla español, tiene a alaniz disposición servicios gratuitos de asistencia lingüística. Llame al [phone].    We comply with applicable federal civil rights laws and [place] laws. We do not discriminate on the basis of race, color, national origin, age, disability, sex, sexual orientation, or gender identity.            Thank you!     Thank you for choosing [facility]  for your care. Our goal is always to provide you with excellent care. Hearing back from our patients is one way we can continue to improve our services. Please take a few minutes to complete the written survey that you may receive in the mail after your visit with us. Thank you!             Your Updated Medication List - Protect others around you: Learn how to safely use, store and throw away your medicines at www.disposemymeds.org.          This list is accurate as of 7/10/18  4:38 PM.  Always use your most recent med list.                   Brand Name Dispense Instructions for use Diagnosis    atorvastatin 80 MG tablet    LIPITOR     Take 80 mg by mouth daily.        DOTTIE LOW STRENGTH 81 MG EC tablet   Generic drug:  aspirin      Take 81 mg by mouth daily.        chlorthalidone 25 MG tablet    HYGROTON     Take 25 mg by mouth daily.        citalopram 10 MG tablet    celeXA    90 tablet    Take 1 tablet (10 mg) by mouth daily    Recurrent major depression in partial remission (H)       conjugated estrogens cream    PREMARIN    30 g    Place 1 g vaginally three times a week    Atrophic vaginitis       * fluticasone 50 MCG/ACT spray     FLONASE    16 g    Spray 1-2 sprays into both nostrils daily    Chronic rhinitis       * fluticasone 50 MCG/ACT spray    FLONASE    1 Bottle    Spray 1-2 sprays into both nostrils daily    Dysfunction of left eustachian tube       levothyroxine 100 MCG tablet    SYNTHROID/LEVOTHROID    90 tablet    Take 1 tablet (100 mcg) by mouth daily    Hypothyroidism, unspecified type       losartan 100 MG tablet    COZAAR     Take 100 mg by mouth daily.        omeprazole 20 MG CR capsule    priLOSEC    180 capsule    Take 1 capsule (20 mg) by mouth 2 times daily as needed    Gastroesophageal reflux disease without esophagitis       potassium chloride 20 MEQ Packet    KLOR-CON     Take 20 mEq by mouth daily.        TGT DAILY MULTIVITAMIN WOMENS PO      Take  by mouth.        VIACTIV 500-100-40 chewable tablet   Generic drug:  calcium-vitamin D-vitamin k      Take 1 chew tab by mouth 2 times daily.        VITAMIN D (CHOLECALCIFEROL) PO      Take 2,000 Units by mouth daily        * Notice:  This list has 2 medication(s) that are the same as other medications prescribed for you. Read the directions carefully, and ask your doctor or other care provider to review them with you.

## 2018-07-10 NOTE — PROGRESS NOTES
SUBJECTIVE:   Miladis Singh is a 72 year old female presenting with a chief complaint of   Chief Complaint   Patient presents with     Otalgia     Pt in clinic to have eval for swollen glands and ear pain.     Mass       She is an established patient of [facility].    URI Adult    Onset of symptoms was 1 day(s) ago.    Current and Associated symptoms:    left gland tender & swollen    Just below left ear    More tender than usually     Seen by chiropractor yesterday'  Told him about right ear symptoms of bubbly noises    had treatment of neck area    symptoms started after treatment     Ac in car irritates it      Review of Systems   Constitutional: Negative for chills and fever.   HENT: Negative for rhinorrhea and sore throat.    Respiratory: Negative for cough.        Past Medical History:   Diagnosis Date     Hyperlipidemia LDL goal <130 2011     Hypertension goal BP (blood pressure) < 140/90 2010     Unspecified hypothyroidism      Family History   Problem Relation Age of Onset     C.A.D. Father      HEART DISEASE Father      MI      Hypertension Father      Cancer Father      lung     Cerebrovascular Disease Father      C.A.D. Mother      Hypertension Mother      Arthritis Mother      Eye Disorder Mother      cateracts     Lipids Mother      Thyroid Disease Mother      Diabetes Paternal Grandfather      type 2     C.A.D. Brother      Diabetes Son      type 1     Hypertension Brother      Circulatory Brother      DVT     Congenital Anomalies Brother       age 15     Lipids Brother      Current Outpatient Prescriptions   Medication Sig Dispense Refill     aspirin (DOTTIE LOW STRENGTH) 81 MG EC tablet Take 81 mg by mouth daily.       atorvastatin (LIPITOR) 80 MG tablet Take 80 mg by mouth daily.       calcium-vitamin D-vitamin k (VIACTIV) 500-100-40 chewable tablet Take 1 chew tab by mouth 2 times daily.       chlorthalidone (HYGROTON) 25 MG tablet Take 25 mg by mouth daily.       citalopram (CELEXA)  "10 MG tablet Take 1 tablet (10 mg) by mouth daily 90 tablet 3     conjugated estrogens (PREMARIN) cream Place 1 g vaginally three times a week 30 g 3     fluticasone (FLONASE) 50 MCG/ACT spray Spray 1-2 sprays into both nostrils daily 1 Bottle 0     fluticasone (FLONASE) 50 MCG/ACT spray Spray 1-2 sprays into both nostrils daily 16 g 11     levothyroxine (SYNTHROID/LEVOTHROID) 100 MCG tablet Take 1 tablet (100 mcg) by mouth daily 90 tablet 3     losartan (COZAAR) 100 MG tablet Take 100 mg by mouth daily.       Multiple Vitamins-Calcium (TGT DAILY MULTIVITAMIN WOMENS PO) Take  by mouth.       omeprazole (PRILOSEC) 20 MG CR capsule Take 1 capsule (20 mg) by mouth 2 times daily as needed 180 capsule 3     potassium chloride (KLOR-CON) 20 MEQ packet Take 20 mEq by mouth daily.       VITAMIN D, CHOLECALCIFEROL, PO Take 2,000 Units by mouth daily       Social History   Substance Use Topics     Smoking status: Former Smoker     Packs/day: 1.50     Years: 5.00     Types: Cigarettes     Quit date: 5/26/1970     Smokeless tobacco: Never Used     Alcohol use Yes      Comment: wine       OBJECTIVE  /72  Pulse 75  Temp 98  F (36.7  C) (Oral)  Ht 5' 0.75\" (1.543 m)  Wt 165 lb (74.8 kg)  SpO2 96%  BMI 31.43 kg/m2    Physical Exam   Constitutional: She appears well-developed and well-nourished.   HENT:   Tm clear b  No tragus or TMJ pain  OP - no tender salivary glands     Neck:   No enlarged salivary glands  Just below left jaw swollen tender soft tissue and 1-2 enlarged LN.  No redness of skin         Labs:  No results found for this or any previous visit (from the past 24 hour(s)).        ASSESSMENT:      ICD-10-CM    1. Dysfunction of left eustachian tube H69.82 fluticasone (FLONASE) 50 MCG/ACT spray   2. Lymph node enlargement R59.9     left upper anterior Lymph node      LN irritation due to manual maneuvers by chiropractor     Medical Decision Making:    Patient Instructions   1.  Ice   & ibuprofen 3 x day with " food  No hard chewing food.  Jaw rest.    If not improved over the next week, consider oral antibiotics      2. flonase few weeks for eustachian tube symptoms - if this is not better, would see ENT    Here check up's in [place]

## 2018-08-27 ENCOUNTER — TRANSFERRED RECORDS (OUTPATIENT)
Dept: HEALTH INFORMATION MANAGEMENT | Facility: CLINIC | Age: 73
End: 2018-08-27

## 2018-09-25 DIAGNOSIS — H69.92 DYSFUNCTION OF LEFT EUSTACHIAN TUBE: ICD-10-CM

## 2018-09-25 RX ORDER — FLUTICASONE PROPIONATE 50 MCG
1-2 SPRAY, SUSPENSION (ML) NASAL DAILY
Qty: 1 BOTTLE | Refills: 0 | Status: SHIPPED | OUTPATIENT
Start: 2018-09-25 | End: 2020-08-03

## 2018-09-25 NOTE — TELEPHONE ENCOUNTER
"Requested Prescriptions   Pending Prescriptions Disp Refills     fluticasone (FLONASE) 50 MCG/ACT spray  Last Written Prescription Date:  7-10-18  Last Fill Quantity: 1 btl,  # refills: 0   Last office visit: 7/10/2018 with prescribing provider:  Aman Salamanca    Future Office Visit:   1 Bottle 0     Sig: Spray 1-2 sprays into both nostrils daily    Inhaled Steroids Protocol Passed    9/25/2018 10:42 AM       Passed - Patient is age 12 or older       Passed - Recent (12 mo) or future (30 days) visit within the authorizing provider's specialty    Patient had office visit in the last 12 months or has a visit in the next 30 days with authorizing provider or within the authorizing provider's specialty.  See \"Patient Info\" tab in inbasket, or \"Choose Columns\" in Meds & Orders section of the refill encounter.              "

## 2018-09-25 NOTE — TELEPHONE ENCOUNTER
Routing refill request to provider for review/approval because:  Dx not on protocol Dysfunction of left eustachian tube [H69.82]  - Primary

## 2018-10-23 ENCOUNTER — RADIANT APPOINTMENT (OUTPATIENT)
Dept: GENERAL RADIOLOGY | Facility: CLINIC | Age: 73
End: 2018-10-23
Attending: FAMILY MEDICINE
Payer: MEDICARE

## 2018-10-23 DIAGNOSIS — R93.6 ABNORMAL X-RAY OF LOWER EXTREMITY: Primary | ICD-10-CM

## 2018-10-23 DIAGNOSIS — R93.6 ABNORMAL X-RAY OF LOWER EXTREMITY: ICD-10-CM

## 2018-10-23 PROCEDURE — 73552 X-RAY EXAM OF FEMUR 2/>: CPT | Mod: LT

## 2018-12-05 ENCOUNTER — TRANSFERRED RECORDS (OUTPATIENT)
Dept: HEALTH INFORMATION MANAGEMENT | Facility: CLINIC | Age: 73
End: 2018-12-05

## 2019-05-16 ENCOUNTER — OFFICE VISIT (OUTPATIENT)
Dept: FAMILY MEDICINE | Facility: CLINIC | Age: 74
End: 2019-05-16
Payer: MEDICARE

## 2019-05-16 VITALS
TEMPERATURE: 97.8 F | SYSTOLIC BLOOD PRESSURE: 150 MMHG | HEART RATE: 75 BPM | RESPIRATION RATE: 16 BRPM | HEIGHT: 62 IN | DIASTOLIC BLOOD PRESSURE: 90 MMHG | WEIGHT: 152 LBS | OXYGEN SATURATION: 96 % | BODY MASS INDEX: 27.97 KG/M2

## 2019-05-16 DIAGNOSIS — F33.41 RECURRENT MAJOR DEPRESSION IN PARTIAL REMISSION (H): ICD-10-CM

## 2019-05-16 DIAGNOSIS — R93.6 ABNORMAL X-RAY OF LOWER EXTREMITY: ICD-10-CM

## 2019-05-16 DIAGNOSIS — R82.90 NONSPECIFIC FINDING ON EXAMINATION OF URINE: ICD-10-CM

## 2019-05-16 DIAGNOSIS — E74.39 GLUCOSE INTOLERANCE: ICD-10-CM

## 2019-05-16 DIAGNOSIS — Z12.31 VISIT FOR SCREENING MAMMOGRAM: Primary | ICD-10-CM

## 2019-05-16 DIAGNOSIS — Z87.440 H/O RECURRENT URINARY TRACT INFECTION: ICD-10-CM

## 2019-05-16 DIAGNOSIS — E03.9 HYPOTHYROIDISM, UNSPECIFIED TYPE: ICD-10-CM

## 2019-05-16 DIAGNOSIS — I10 HYPERTENSION GOAL BP (BLOOD PRESSURE) < 140/90: ICD-10-CM

## 2019-05-16 DIAGNOSIS — R30.0 DYSURIA: ICD-10-CM

## 2019-05-16 LAB
ALBUMIN UR-MCNC: NEGATIVE MG/DL
APPEARANCE UR: CLEAR
BILIRUB UR QL STRIP: NEGATIVE
COLOR UR AUTO: YELLOW
GLUCOSE UR STRIP-MCNC: NEGATIVE MG/DL
HGB UR QL STRIP: NEGATIVE
KETONES UR STRIP-MCNC: NEGATIVE MG/DL
LEUKOCYTE ESTERASE UR QL STRIP: ABNORMAL
NITRATE UR QL: NEGATIVE
NON-SQ EPI CELLS #/AREA URNS LPF: ABNORMAL /LPF
PH UR STRIP: 6 PH (ref 5–7)
RBC #/AREA URNS AUTO: ABNORMAL /HPF
SOURCE: ABNORMAL
SP GR UR STRIP: 1.01 (ref 1–1.03)
TRANS CELLS #/AREA URNS HPF: ABNORMAL /HPF
UROBILINOGEN UR STRIP-ACNC: 0.2 EU/DL (ref 0.2–1)
WBC #/AREA URNS AUTO: ABNORMAL /HPF

## 2019-05-16 PROCEDURE — 84439 ASSAY OF FREE THYROXINE: CPT | Performed by: FAMILY MEDICINE

## 2019-05-16 PROCEDURE — 84443 ASSAY THYROID STIM HORMONE: CPT | Performed by: FAMILY MEDICINE

## 2019-05-16 PROCEDURE — 81001 URINALYSIS AUTO W/SCOPE: CPT | Performed by: FAMILY MEDICINE

## 2019-05-16 PROCEDURE — 99214 OFFICE O/P EST MOD 30 MIN: CPT | Performed by: FAMILY MEDICINE

## 2019-05-16 PROCEDURE — 80048 BASIC METABOLIC PNL TOTAL CA: CPT | Performed by: FAMILY MEDICINE

## 2019-05-16 PROCEDURE — 36415 COLL VENOUS BLD VENIPUNCTURE: CPT | Performed by: FAMILY MEDICINE

## 2019-05-16 PROCEDURE — 87086 URINE CULTURE/COLONY COUNT: CPT | Performed by: FAMILY MEDICINE

## 2019-05-16 RX ORDER — QUETIAPINE FUMARATE 25 MG/1
25 TABLET, FILM COATED ORAL
COMMUNITY
Start: 2019-03-11 | End: 2019-05-20

## 2019-05-16 RX ORDER — CITALOPRAM HYDROBROMIDE 10 MG/1
10 TABLET ORAL DAILY
Qty: 90 TABLET | Refills: 3 | Status: SHIPPED | OUTPATIENT
Start: 2019-05-16 | End: 2020-06-09

## 2019-05-16 ASSESSMENT — ANXIETY QUESTIONNAIRES
IF YOU CHECKED OFF ANY PROBLEMS ON THIS QUESTIONNAIRE, HOW DIFFICULT HAVE THESE PROBLEMS MADE IT FOR YOU TO DO YOUR WORK, TAKE CARE OF THINGS AT HOME, OR GET ALONG WITH OTHER PEOPLE: NOT DIFFICULT AT ALL
7. FEELING AFRAID AS IF SOMETHING AWFUL MIGHT HAPPEN: NOT AT ALL
6. BECOMING EASILY ANNOYED OR IRRITABLE: NOT AT ALL
GAD7 TOTAL SCORE: 2
1. FEELING NERVOUS, ANXIOUS, OR ON EDGE: SEVERAL DAYS
2. NOT BEING ABLE TO STOP OR CONTROL WORRYING: SEVERAL DAYS
3. WORRYING TOO MUCH ABOUT DIFFERENT THINGS: NOT AT ALL
5. BEING SO RESTLESS THAT IT IS HARD TO SIT STILL: NOT AT ALL

## 2019-05-16 ASSESSMENT — PATIENT HEALTH QUESTIONNAIRE - PHQ9
SUM OF ALL RESPONSES TO PHQ QUESTIONS 1-9: 1
5. POOR APPETITE OR OVEREATING: NOT AT ALL

## 2019-05-16 ASSESSMENT — MIFFLIN-ST. JEOR: SCORE: 1141.69

## 2019-05-17 LAB
ANION GAP SERPL CALCULATED.3IONS-SCNC: 6 MMOL/L (ref 3–14)
BACTERIA SPEC CULT: NO GROWTH
BUN SERPL-MCNC: 15 MG/DL (ref 7–30)
CALCIUM SERPL-MCNC: 9.1 MG/DL (ref 8.5–10.1)
CHLORIDE SERPL-SCNC: 108 MMOL/L (ref 94–109)
CO2 SERPL-SCNC: 27 MMOL/L (ref 20–32)
CREAT SERPL-MCNC: 0.54 MG/DL (ref 0.52–1.04)
GFR SERPL CREATININE-BSD FRML MDRD: >90 ML/MIN/{1.73_M2}
GLUCOSE SERPL-MCNC: 92 MG/DL (ref 70–99)
POTASSIUM SERPL-SCNC: 4.2 MMOL/L (ref 3.4–5.3)
SODIUM SERPL-SCNC: 141 MMOL/L (ref 133–144)
SPECIMEN SOURCE: NORMAL
T4 FREE SERPL-MCNC: 1.82 NG/DL (ref 0.76–1.46)
TSH SERPL DL<=0.005 MIU/L-ACNC: 0.02 MU/L (ref 0.4–4)

## 2019-05-17 ASSESSMENT — ANXIETY QUESTIONNAIRES: GAD7 TOTAL SCORE: 2

## 2019-05-20 ENCOUNTER — TELEPHONE (OUTPATIENT)
Dept: FAMILY MEDICINE | Facility: CLINIC | Age: 74
End: 2019-05-20

## 2019-05-20 DIAGNOSIS — E03.9 HYPOTHYROIDISM, UNSPECIFIED TYPE: Primary | ICD-10-CM

## 2019-05-20 RX ORDER — LEVOTHYROXINE SODIUM 137 UG/1
137 TABLET ORAL DAILY
Qty: 90 TABLET | Refills: 0 | Status: SHIPPED | OUTPATIENT
Start: 2019-05-20 | End: 2019-07-26

## 2019-05-20 NOTE — TELEPHONE ENCOUNTER
Pt called back. We discussed lab results, also no UTI. Pt will call back in 2 months for recheck, or sooner if urinary symptoms persist.    Kim Mercer RN, BSN   [facility]

## 2019-05-20 NOTE — TELEPHONE ENCOUNTER
Now it looks like you are getting too much thyroid hormone.     We should cut back on the dose a bit. I sent in a refill for 137mcg daily. Recheck in 2 months.     I will have my nurse try to call you with the results to make sure you get the message.    Aman Salamanca

## 2019-05-20 NOTE — TELEPHONE ENCOUNTER
Left message for patient to call back and speak with a nurse.    Kim Mercer RN, BSN   [facility]

## 2019-05-23 ENCOUNTER — ALLIED HEALTH/NURSE VISIT (OUTPATIENT)
Dept: NURSING | Facility: CLINIC | Age: 74
End: 2019-05-23
Payer: MEDICARE

## 2019-05-23 VITALS — SYSTOLIC BLOOD PRESSURE: 139 MMHG | DIASTOLIC BLOOD PRESSURE: 84 MMHG

## 2019-05-23 DIAGNOSIS — Z01.30 BP CHECK: Primary | ICD-10-CM

## 2019-05-23 PROCEDURE — 99207 ZZC NO CHARGE NURSE ONLY: CPT

## 2019-05-23 NOTE — NURSING NOTE
Miladis Singh is a 73 year old patient who comes in today for a Blood Pressure check.  Initial BP:  /84      Data Unavailable  Disposition: Made office visit with Dr. Salamanca per office visit notes    Guadalupe Cabral MA

## 2019-06-04 ENCOUNTER — OFFICE VISIT (OUTPATIENT)
Dept: FAMILY MEDICINE | Facility: CLINIC | Age: 74
End: 2019-06-04
Payer: MEDICARE

## 2019-06-04 VITALS
WEIGHT: 151 LBS | SYSTOLIC BLOOD PRESSURE: 162 MMHG | HEART RATE: 75 BPM | RESPIRATION RATE: 16 BRPM | TEMPERATURE: 97.4 F | BODY MASS INDEX: 27.4 KG/M2 | DIASTOLIC BLOOD PRESSURE: 85 MMHG

## 2019-06-04 DIAGNOSIS — R60.0 PEDAL EDEMA: ICD-10-CM

## 2019-06-04 DIAGNOSIS — I10 HYPERTENSION GOAL BP (BLOOD PRESSURE) < 140/90: Primary | ICD-10-CM

## 2019-06-04 PROCEDURE — 99214 OFFICE O/P EST MOD 30 MIN: CPT | Performed by: NURSE PRACTITIONER
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
## 2019-06-25 ENCOUNTER — OFFICE VISIT (OUTPATIENT)
Dept: FAMILY MEDICINE | Facility: CLINIC | Age: 74
End: 2019-06-25
Payer: MEDICARE

## 2019-06-25 ENCOUNTER — ANCILLARY PROCEDURE (OUTPATIENT)
Dept: GENERAL RADIOLOGY | Facility: CLINIC | Age: 74
End: 2019-06-25
Attending: FAMILY MEDICINE
Payer: MEDICARE

## 2019-06-25 VITALS
OXYGEN SATURATION: 98 % | RESPIRATION RATE: 16 BRPM | DIASTOLIC BLOOD PRESSURE: 84 MMHG | TEMPERATURE: 97.5 F | BODY MASS INDEX: 26.71 KG/M2 | WEIGHT: 147.2 LBS | HEART RATE: 71 BPM | SYSTOLIC BLOOD PRESSURE: 146 MMHG

## 2019-06-25 DIAGNOSIS — I10 HYPERTENSION GOAL BP (BLOOD PRESSURE) < 140/90: ICD-10-CM

## 2019-06-25 DIAGNOSIS — R93.89 ABNORMAL X-RAY: ICD-10-CM

## 2019-06-25 DIAGNOSIS — E03.9 HYPOTHYROIDISM, UNSPECIFIED TYPE: Primary | ICD-10-CM

## 2019-06-25 DIAGNOSIS — M54.10 BACK PAIN WITH RIGHT-SIDED RADICULOPATHY: ICD-10-CM

## 2019-06-25 PROCEDURE — 99214 OFFICE O/P EST MOD 30 MIN: CPT | Performed by: FAMILY MEDICINE

## 2019-06-25 PROCEDURE — 73552 X-RAY EXAM OF FEMUR 2/>: CPT | Mod: LT

## 2019-06-25 NOTE — PROGRESS NOTES
Subjective     Miladis Singh is a 73 year old female who presents to clinic today for the following health issues:    HPI   Depression Followup    How are you doing with your depression since your last visit? No change, doing well    Are you having other symptoms that might be associated with depression? No    Have you had a significant life event?  No     Are you feeling anxious or having panic attacks?   No    Do you have any concerns with your use of alcohol or other drugs? No    Social History     Tobacco Use     Smoking status: Former Smoker     Packs/day: 1.50     Years: 5.00     Pack years: 7.50     Types: Cigarettes     Last attempt to quit: 1970     Years since quittin.1     Smokeless tobacco: Never Used   Substance Use Topics     Alcohol use: Yes     Comment: wine     Drug use: No     PHQ 2017   PHQ-9 Total Score 1 2 1   Q9: Thoughts of better off dead/self-harm past 2 weeks Not at all Not at all Not at all     LOGAN-7 SCORE 2018   Total Score 2 2          Suicide Assessment Five-step Evaluation and Treatment (SAFE-T)    Hypertension Follow-up      Do you check your blood pressure regularly outside of the clinic? No checked at dentist and was 160/92    Are you following a low salt diet? Yes    Are your blood pressures ever more than 140 on the top number (systolic) OR more   than 90 on the bottom number (diastolic), for example 140/90? Yes    Amount of exercise or physical activity: 6-7 days/week for an average of greater than 60 minutes    Problems taking medications regularly: No    Medication side effects: none    Diet: low salt    Depression-symptoms have been better since problems with her house have improved and moving back in after floding  htn-off chlorthalidone.stiol on losartan but bp has been up and her swelling in her ankles has increased.  Abnormal xrya of leg-asymptomatic. Needs recheck for stability.   Back pian radiating down right leg. Not  worse when sitting. No numbness/tingling  Noted. This is similar to pain she had about 10 years ago helped by injection but that was on the left side.   med hx, family hx, and soc hx reviewed and updated     No cv, neuro symptoms     OBJECTIVE: /84 (BP Location: Left arm, Patient Position: Chair, Cuff Size: Adult Regular)   Pulse 71   Temp 97.5  F (36.4  C) (Oral)   Resp 16   Wt 66.8 kg (147 lb 3.2 oz)   SpO2 98%   BMI 26.71 kg/m   Exam:  GENERAL APPEARANCE: healthy, alert and no distress  EYES: Eyes grossly normal to inspection  HENT: ear canals and TM's normal and nose and mouth without ulcers or lesions  RESP: lungs clear to auscultation - no rales, rhonchi or wheezes  CV: regular rates and rhythm, normal S1 S2, no S3 or S4 and no murmur, click or rub -  ABDOMEN:  soft, nontender, no HSM or masses and bowel sounds normal     Xray showed stable focus left femur    1. Abnormal x-ray  Recheck stable  - XR Femur Left 2 Views; Future  - **ESR FUTURE anytime; Future  - **Comprehensive metabolic panel FUTURE anytime; Future    2. Back pain with right-sided radiculopathy  ? Related to djd and nerve root impingment. Will repeat MRI and cosdier injection which was helpful in the past. The physical therapy was not helpful.   - MR Lumbar Spine w/o Contrast; Future  - **ESR FUTURE anytime; Future    3. Hypertension goal BP (blood pressure) < 140/90  Not at goal. Add back in chlorthalidone at lower dose 12.5 mg daiy in addition to losartan she is already on.  - **Comprehensive metabolic panel FUTURE anytime; Future    4. Hypothyroidism, unspecified type  Change dosage based on results plan for recheck in 2-3 weeks.   - **TSH with free T4 reflex FUTURE anytime; Future

## 2019-06-25 NOTE — PATIENT INSTRUCTIONS
Restart the chlorthalidone at 12.5 mg daily (1/2 tablet daily).    See me again in 3 weeks. We will do labs that day.     To schedule your mri of the spine, call [phone] for [place] scheduling. [phone] at [facility]

## 2019-07-23 ENCOUNTER — OFFICE VISIT (OUTPATIENT)
Dept: FAMILY MEDICINE | Facility: CLINIC | Age: 74
End: 2019-07-23
Payer: MEDICARE

## 2019-07-23 VITALS
SYSTOLIC BLOOD PRESSURE: 122 MMHG | BODY MASS INDEX: 26.31 KG/M2 | DIASTOLIC BLOOD PRESSURE: 78 MMHG | OXYGEN SATURATION: 97 % | TEMPERATURE: 97.8 F | HEART RATE: 72 BPM | RESPIRATION RATE: 16 BRPM | WEIGHT: 145 LBS

## 2019-07-23 DIAGNOSIS — R93.89 ABNORMAL X-RAY: ICD-10-CM

## 2019-07-23 DIAGNOSIS — M54.10 BACK PAIN WITH RIGHT-SIDED RADICULOPATHY: ICD-10-CM

## 2019-07-23 DIAGNOSIS — I10 HYPERTENSION GOAL BP (BLOOD PRESSURE) < 140/90: Primary | ICD-10-CM

## 2019-07-23 DIAGNOSIS — E03.9 HYPOTHYROIDISM, UNSPECIFIED TYPE: ICD-10-CM

## 2019-07-23 LAB — ERYTHROCYTE [SEDIMENTATION RATE] IN BLOOD BY WESTERGREN METHOD: 7 MM/H (ref 0–30)

## 2019-07-23 PROCEDURE — 84439 ASSAY OF FREE THYROXINE: CPT | Performed by: FAMILY MEDICINE

## 2019-07-23 PROCEDURE — 80053 COMPREHEN METABOLIC PANEL: CPT | Performed by: FAMILY MEDICINE

## 2019-07-23 PROCEDURE — 84443 ASSAY THYROID STIM HORMONE: CPT | Performed by: FAMILY MEDICINE

## 2019-07-23 PROCEDURE — 36415 COLL VENOUS BLD VENIPUNCTURE: CPT | Performed by: FAMILY MEDICINE

## 2019-07-23 PROCEDURE — 99213 OFFICE O/P EST LOW 20 MIN: CPT | Performed by: FAMILY MEDICINE

## 2019-07-23 PROCEDURE — 85652 RBC SED RATE AUTOMATED: CPT | Performed by: FAMILY MEDICINE

## 2019-07-23 RX ORDER — CHLORTHALIDONE 25 MG/1
TABLET ORAL
Qty: 45 TABLET | Refills: 3 | Status: SHIPPED | OUTPATIENT
Start: 2019-07-23 | End: 2020-06-09

## 2019-07-23 RX ORDER — HYDROCHLOROTHIAZIDE 12.5 MG/1
25 TABLET ORAL DAILY
Qty: 90 TABLET | Refills: 3 | Status: SHIPPED | OUTPATIENT
Start: 2019-07-23 | End: 2019-07-23

## 2019-07-23 NOTE — PROGRESS NOTES
Subjective     Miladis Singh is a 74 year old female who presents to clinic today for the following health issues:    HPI   Hypertension Follow-up      Do you check your blood pressure regularly outside of the clinic? Yes, pt reports results are  Better     Are you following a low salt diet? Yes    Are your blood pressures ever more than 140 on the top number (systolic) OR more   than 90 on the bottom number (diastolic), for example 140/90? No    Amount of exercise or physical activity: 4-5 days/week. Doing house chores    Problems taking medications regularly: No    Medication side effects: none    Diet: regular (no restrictions)    Additional: wants TSH done       Reviewed and updated as needed this visit by Provider         Review of Systems   No cv symptoms     still having the back pain. The MRI is upcoming.       Objective    /78 (BP Location: Right arm, Patient Position: Sitting, Cuff Size: Adult Regular)   Pulse 72   Temp 97.8  F (36.6  C) (Oral)   Resp 16   Wt 65.8 kg (145 lb)   SpO2 97%   BMI 26.31 kg/m    Body mass index is 26.31 kg/m .  Physical Exam   GENERAL: healthy, alert and no distress  EYES: Eyes grossly normal to inspection  PSYCH: mentation appears normal, affect normal/bright    Diagnostic Test Results:  Labs pending    Assessment & Plan       ICD-10-CM    1. Hypertension goal BP (blood pressure) < 140/90 I10 **Comprehensive metabolic panel FUTURE anytime     chlorthalidone (HYGROTON) 25 MG tablet     DISCONTINUED: hydrochlorothiazide (HYDRODIURIL) 12.5 MG tablet   2. Abnormal x-ray R93.89 **Comprehensive metabolic panel FUTURE anytime     **ESR FUTURE anytime   3. Hypothyroidism, unspecified type E03.9 **TSH with free T4 reflex FUTURE anytime   4. Back pain with right-sided radiculopathy M54.10 **ESR FUTURE anytime   no change in bp meds. continue on losartan 100 and chlorthalidone 12.5mg daily. Labs today. deisy consider injection once mri back.      BMI:   Estimated body mass  "index is 26.31 kg/m  as calculated from the following:    Height as of 5/16/19: 1.581 m (5' 2.25\").    Weight as of this encounter: 65.8 kg (145 lb).       Return in about 3 months (around 10/23/2019) for follow up appointment.    Aman Salamanca MD  [facility]        "

## 2019-07-24 LAB
ALBUMIN SERPL-MCNC: 4.1 G/DL (ref 3.4–5)
ALP SERPL-CCNC: 50 U/L (ref 40–150)
ALT SERPL W P-5'-P-CCNC: 33 U/L (ref 0–50)
ANION GAP SERPL CALCULATED.3IONS-SCNC: 8 MMOL/L (ref 3–14)
AST SERPL W P-5'-P-CCNC: 21 U/L (ref 0–45)
BILIRUB SERPL-MCNC: 0.3 MG/DL (ref 0.2–1.3)
BUN SERPL-MCNC: 17 MG/DL (ref 7–30)
CALCIUM SERPL-MCNC: 9.3 MG/DL (ref 8.5–10.1)
CHLORIDE SERPL-SCNC: 101 MMOL/L (ref 94–109)
CO2 SERPL-SCNC: 27 MMOL/L (ref 20–32)
CREAT SERPL-MCNC: 0.47 MG/DL (ref 0.52–1.04)
GFR SERPL CREATININE-BSD FRML MDRD: >90 ML/MIN/{1.73_M2}
GLUCOSE SERPL-MCNC: 88 MG/DL (ref 70–99)
POTASSIUM SERPL-SCNC: 4 MMOL/L (ref 3.4–5.3)
PROT SERPL-MCNC: 6.9 G/DL (ref 6.8–8.8)
SODIUM SERPL-SCNC: 136 MMOL/L (ref 133–144)
T4 FREE SERPL-MCNC: 1.6 NG/DL (ref 0.76–1.46)
TSH SERPL DL<=0.005 MIU/L-ACNC: <0.01 MU/L (ref 0.4–4)

## 2019-07-26 ENCOUNTER — TELEPHONE (OUTPATIENT)
Dept: FAMILY MEDICINE | Facility: CLINIC | Age: 74
End: 2019-07-26

## 2019-07-26 DIAGNOSIS — E03.9 HYPOTHYROIDISM, UNSPECIFIED TYPE: Primary | ICD-10-CM

## 2019-07-26 RX ORDER — LEVOTHYROXINE SODIUM 125 UG/1
125 TABLET ORAL DAILY
Qty: 90 TABLET | Refills: 0 | Status: SHIPPED | OUTPATIENT
Start: 2019-07-26 | End: 2019-09-13

## 2019-07-26 NOTE — TELEPHONE ENCOUNTER
Your TSH value is low (goal 0.4-2.5) suggesting that you are getting too much thyroid hormone replacement. We should cut back on your dosage slightly. I sent to your pharmacy a prescription for 0.125mg daily. Recheck in 6-8 weeks.     I will have my nurse try to call you with the results to make sure you get the message.    Aman Salamanca

## 2019-08-07 ENCOUNTER — ANCILLARY PROCEDURE (OUTPATIENT)
Dept: MRI IMAGING | Facility: CLINIC | Age: 74
End: 2019-08-07
Attending: FAMILY MEDICINE
Payer: MEDICARE

## 2019-08-07 DIAGNOSIS — M54.10 BACK PAIN WITH RIGHT-SIDED RADICULOPATHY: ICD-10-CM

## 2019-08-08 ENCOUNTER — TELEPHONE (OUTPATIENT)
Dept: FAMILY MEDICINE | Facility: CLINIC | Age: 74
End: 2019-08-08

## 2019-08-08 DIAGNOSIS — M54.10 BACK PAIN WITH RIGHT-SIDED RADICULOPATHY: Primary | ICD-10-CM

## 2019-08-08 NOTE — TELEPHONE ENCOUNTER
There is evidence of narrowing on the right side where one of the nerve come out from the spinal canal. This could be causing your pain.   We could refer you over to the pain clinic to consider doing an injection.     I will have my nurse try to call you with the results to make sure you get the message and can issue the referral for you.    Aman Salamanca

## 2019-08-08 NOTE — TELEPHONE ENCOUNTER
Spoke with pt. She would like to go to the pain clinic  I casey'd up the referral but can you please fill out the rest?     Thanks!     Malika Chang RN

## 2019-08-09 NOTE — TELEPHONE ENCOUNTER
Patient has been phoned and notified to expect a call and also messaged her with the referral number.  Neela Chacon RN

## 2019-09-03 DIAGNOSIS — N95.2 ATROPHIC VAGINITIS: ICD-10-CM

## 2019-09-04 NOTE — TELEPHONE ENCOUNTER
"Requested Prescriptions   Pending Prescriptions Disp Refills     PREMARIN 0.625 MG/GM vaginal cream [Pharmacy Med Name: PREMARIN VAGINAL CREAM 30GM]  Last Written Prescription Date:  6-2-18  Last Fill Quantity: 30 g,  # refills: 3   Last office visit: 7/23/2019 with prescribing provider:  Aman Salamanca    Future Office Visit:    30 g 0     Sig: PLACE 1 GRAM VAGINALLY 3 TIMES A WEEK       Hormone Replacement Therapy Passed - 9/3/2019  3:14 PM        Passed - Blood pressure under 140/90 in past 12 months     BP Readings from Last 3 Encounters:   07/23/19 122/78   06/25/19 146/84   06/04/19 162/85           Passed - Recent (12 mo) or future (30 days) visit within the authorizing provider's specialty     Patient had office visit in the last 12 months or has a visit in the next 30 days with authorizing provider or within the authorizing provider's specialty.  See \"Patient Info\" tab in inbasket, or \"Choose Columns\" in Meds & Orders section of the refill encounter.          Passed - Medication is active on med list        Passed - Patient is 18 years of age or older        Passed - No active pregnancy on record        Passed - No positive pregnancy test on record in past 12 months         "

## 2019-09-06 ENCOUNTER — MYC MEDICAL ADVICE (OUTPATIENT)
Dept: FAMILY MEDICINE | Facility: CLINIC | Age: 74
End: 2019-09-06

## 2019-09-06 DIAGNOSIS — R19.7 DIARRHEA, UNSPECIFIED TYPE: Primary | ICD-10-CM

## 2019-09-06 RX ORDER — CONJUGATED ESTROGENS 0.62 MG/G
CREAM VAGINAL
Qty: 30 G | Refills: 11 | Status: SHIPPED | OUTPATIENT
Start: 2019-09-06 | End: 2020-06-09

## 2019-09-06 NOTE — TELEPHONE ENCOUNTER
Last mammogram 3/1/17 and normal per pt report[place]    Refill request to MD/primary care provider.  Mary Coyle RN

## 2019-09-12 DIAGNOSIS — E03.9 HYPOTHYROIDISM, UNSPECIFIED TYPE: ICD-10-CM

## 2019-09-12 LAB
T4 FREE SERPL-MCNC: 1.59 NG/DL (ref 0.76–1.46)
TSH SERPL DL<=0.005 MIU/L-ACNC: <0.01 MU/L (ref 0.4–4)

## 2019-09-12 PROCEDURE — 84439 ASSAY OF FREE THYROXINE: CPT | Performed by: FAMILY MEDICINE

## 2019-09-12 PROCEDURE — 36415 COLL VENOUS BLD VENIPUNCTURE: CPT | Performed by: FAMILY MEDICINE

## 2019-09-12 PROCEDURE — 84443 ASSAY THYROID STIM HORMONE: CPT | Performed by: FAMILY MEDICINE

## 2019-09-12 NOTE — TELEPHONE ENCOUNTER
[facility] a few women providers and they are the specialists who help with this most often.

## 2019-09-13 ENCOUNTER — TELEPHONE (OUTPATIENT)
Dept: FAMILY MEDICINE | Facility: CLINIC | Age: 74
End: 2019-09-13

## 2019-09-13 DIAGNOSIS — E03.9 HYPOTHYROIDISM, UNSPECIFIED TYPE: Primary | ICD-10-CM

## 2019-09-13 RX ORDER — LEVOTHYROXINE SODIUM 112 UG/1
112 TABLET ORAL DAILY
Qty: 90 TABLET | Refills: 0 | Status: SHIPPED | OUTPATIENT
Start: 2019-09-13 | End: 2019-12-18

## 2019-09-13 NOTE — TELEPHONE ENCOUNTER
Your TSH value is low (goal 0.4-2.5) suggesting that you are getting too much thyroid hormone replacement. We should cut back on your dosage slightly. I sent to your pharmacy a prescription for 0.112 mg daily Recheck in 6-8 weeks.   I will have my nurse try to call you with the results to make sure you get the message.    Aman Salamanca MD

## 2019-09-20 ENCOUNTER — TRANSFERRED RECORDS (OUTPATIENT)
Dept: HEALTH INFORMATION MANAGEMENT | Facility: CLINIC | Age: 74
End: 2019-09-20

## 2019-10-21 ENCOUNTER — TRANSFERRED RECORDS (OUTPATIENT)
Dept: HEALTH INFORMATION MANAGEMENT | Facility: CLINIC | Age: 74
End: 2019-10-21

## 2019-10-27 DIAGNOSIS — Z87.440 H/O RECURRENT URINARY TRACT INFECTION: ICD-10-CM

## 2019-10-28 NOTE — TELEPHONE ENCOUNTER
"Requested Prescriptions   Pending Prescriptions Disp Refills     sulfamethoxazole-trimethoprim (BACTRIM DS/SEPTRA DS) 800-160 MG tablet [Pharmacy Med Name: SULFAMETH//160MG TB] 6 tablet 0     Sig: TAKE 1 TABLET BY MOUTH TWICE DAILY FOR 3 DAYS         Last Written Prescription Date:  6/22/18  Last Fill Quantity: 6,   # refills: 3  Last Office Visit: 7/23/19  Future Office visit:       Routing refill request to provider for review/approval because:  Drug not on the [facility], [facility] or [facility] refill protocol or controlled substance      Oral Acne/Rosacea Medications Protocol Failed - 10/27/2019  2:08 PM        Failed - Confirmation of diagnosis is required     Please confirm diagnosis is acne or rosacea.     If NOT acne or rosacea; refer request to provider for further evaluation.    If diagnosis IS acne or rosacea, OK to refill BASED ON PREVIOUS REFILL CLINICAL NOTE RECOMMENDATION.          Failed - Medication is active on med list        Passed - Patient is 12 years of age or older        Passed - Recent (12 mo) or future (30 days) visit within the authorizing provider's specialty     Patient has had an office visit with the authorizing provider or a provider within the authorizing providers department within the previous 12 mos or has a future within next 30 days. See \"Patient Info\" tab in inbasket, or \"Choose Columns\" in Meds & Orders section of the refill encounter.              Passed - No active pregnancy on record        Passed - No positive prenancy test is past 12 months         "

## 2019-10-29 DIAGNOSIS — Z87.440 H/O RECURRENT URINARY TRACT INFECTION: ICD-10-CM

## 2019-10-29 RX ORDER — SULFAMETHOXAZOLE/TRIMETHOPRIM 800-160 MG
TABLET ORAL
Qty: 0.1 TABLET | Refills: 0 | OUTPATIENT
Start: 2019-10-29

## 2019-10-30 NOTE — TELEPHONE ENCOUNTER
Patient making multiple requests - see other encounter - per Dr. Salamanca appointment or e-visit required please    Refused Prescriptions:                       Disp   Refills    sulfamethoxazole-trimethoprim (BACTRIM DS/*0.1 ta*0        Sig: TAKE 1 TABLET BY MOUTH TWICE DAILY FOR 3 DAYS  Refused By: DEMETRIO JO  Reason for Refusal: Appt required, please call patient

## 2019-10-30 NOTE — TELEPHONE ENCOUNTER
LM to return clinic phone call. Patient is due for follow-up appt or E-visit.  Sending aBIZinaBOX message with instructions on how to initiate an E-visit.     Martina Tapia

## 2019-10-30 NOTE — TELEPHONE ENCOUNTER
HP Reception - please advise office visit     Refused Prescriptions:                       Disp   Refills    sulfamethoxazole-trimethoprim (BACTRIM DS/*0.1 ta*0        Sig: TAKE 1 TABLET BY MOUTH TWICE DAILY FOR 3 DAYS  Refused By: DEMETRIO JO  Reason for Refusal: Appt required, please call patient

## 2019-11-03 ENCOUNTER — HEALTH MAINTENANCE LETTER (OUTPATIENT)
Age: 74
End: 2019-11-03

## 2019-12-06 ENCOUNTER — TRANSFERRED RECORDS (OUTPATIENT)
Dept: HEALTH INFORMATION MANAGEMENT | Facility: CLINIC | Age: 74
End: 2019-12-06

## 2019-12-17 ENCOUNTER — OFFICE VISIT (OUTPATIENT)
Dept: FAMILY MEDICINE | Facility: CLINIC | Age: 74
End: 2019-12-17
Payer: MEDICARE

## 2019-12-17 VITALS
DIASTOLIC BLOOD PRESSURE: 86 MMHG | WEIGHT: 151 LBS | TEMPERATURE: 98.5 F | SYSTOLIC BLOOD PRESSURE: 132 MMHG | OXYGEN SATURATION: 98 % | HEART RATE: 73 BPM | BODY MASS INDEX: 27.4 KG/M2 | RESPIRATION RATE: 18 BRPM

## 2019-12-17 DIAGNOSIS — Z87.440 H/O RECURRENT URINARY TRACT INFECTION: ICD-10-CM

## 2019-12-17 DIAGNOSIS — E03.9 HYPOTHYROIDISM, UNSPECIFIED TYPE: Primary | ICD-10-CM

## 2019-12-17 DIAGNOSIS — E74.39 GLUCOSE INTOLERANCE: ICD-10-CM

## 2019-12-17 DIAGNOSIS — K21.00 GASTROESOPHAGEAL REFLUX DISEASE WITH ESOPHAGITIS: ICD-10-CM

## 2019-12-17 DIAGNOSIS — R73.9 ELEVATED BLOOD SUGAR: ICD-10-CM

## 2019-12-17 PROBLEM — E87.6 HYPOKALEMIA: Status: ACTIVE | Noted: 2019-03-12

## 2019-12-17 PROBLEM — E88.810 METABOLIC SYNDROME: Status: ACTIVE | Noted: 2018-04-26

## 2019-12-17 PROBLEM — R01.1 UNDIAGNOSED CARDIAC MURMURS: Status: ACTIVE | Noted: 2018-04-26

## 2019-12-17 PROBLEM — E78.00 PURE HYPERCHOLESTEROLEMIA: Status: ACTIVE | Noted: 2018-04-26

## 2019-12-17 PROBLEM — I25.10 ATHEROSCLEROTIC HEART DISEASE OF NATIVE CORONARY ARTERY WITHOUT ANGINA PECTORIS: Status: ACTIVE | Noted: 2018-04-26

## 2019-12-17 LAB
HBA1C MFR BLD: 5 % (ref 0–5.6)
VIT B12 SERPL-MCNC: 1053 PG/ML (ref 193–986)

## 2019-12-17 PROCEDURE — 36415 COLL VENOUS BLD VENIPUNCTURE: CPT | Performed by: FAMILY MEDICINE

## 2019-12-17 PROCEDURE — 82607 VITAMIN B-12: CPT | Performed by: FAMILY MEDICINE

## 2019-12-17 PROCEDURE — 99214 OFFICE O/P EST MOD 30 MIN: CPT | Performed by: FAMILY MEDICINE

## 2019-12-17 PROCEDURE — 84443 ASSAY THYROID STIM HORMONE: CPT | Performed by: FAMILY MEDICINE

## 2019-12-17 PROCEDURE — 84439 ASSAY OF FREE THYROXINE: CPT | Performed by: FAMILY MEDICINE

## 2019-12-17 PROCEDURE — 83036 HEMOGLOBIN GLYCOSYLATED A1C: CPT | Performed by: FAMILY MEDICINE

## 2019-12-17 RX ORDER — SULFAMETHOXAZOLE/TRIMETHOPRIM 800-160 MG
1 TABLET ORAL 2 TIMES DAILY
Qty: 6 TABLET | Refills: 3 | Status: SHIPPED | OUTPATIENT
Start: 2019-12-17 | End: 2020-06-09

## 2019-12-17 NOTE — PROGRESS NOTES
Subjective     Miladis Singh is a 74 year old female who presents to clinic today for the following health issues:    Prediabetes- wonders if the metformin may be causing shakiness. Otherwise tolerated. Lost some weight.     Hypothyroidism. Last TSH low. Due for recheck lowered dose last appt. Since then still shaky but no abnormal rhythms. No weight cahgne. Mood good.     H/O recurrent utis-gets about 3 times annuallly .would liek a rx to use when symptoms show.   Not exercising.   Reviewed and updated as needed this visit by Provider         Review of Systems   No cv nor neuro symptoms       Objective    /86   Pulse 73   Temp 98.5  F (36.9  C) (Tympanic)   Resp 18   Wt 68.5 kg (151 lb)   SpO2 98%   BMI 27.40 kg/m    Body mass index is 27.4 kg/m .  Physical Exam   GENERAL: healthy, alert and no distress  NECK: no adenopathy, no asymmetry, masses, or scars and thyroid normal to palpation  RESP: lungs clear to auscultation - no rales, rhonchi or wheezes  CV: regular rate and rhythm, normal S1 S2, no S3 or S4, no murmur, click or rub, no peripheral edema and peripheral pulses strong  ABDOMEN: soft, nontender, no hepatosplenomegaly, no masses and bowel sounds normal  MS: no gross musculoskeletal defects noted, no edema    Diagnostic Test Results:  Labs pending.           Assessment & Plan     1. H/O recurrent urinary tract infection  Discussed when to use v when to be seen.   - sulfamethoxazole-trimethoprim (BACTRIM DS/SEPTRA DS) 800-160 MG tablet; Take 1 tablet by mouth 2 times daily To take with the signs of bladder infection.  Dispense: 6 tablet; Refill: 3    2. Hypothyroidism, unspecified type  Change dosage based on results   - TSH with free T4 reflex    3. Glucose intolerance  If a1c normal. Cut back on metformin to once daily.   - Hemoglobin A1c    4. Gastroesophageal reflux disease with esophagitis   check b12 due to metformin and PPI use.   - Vitamin B12       Regular exercise    Return in about  6 months (around 6/17/2020) for follow up appointment.    Aman Salamanca MD  [facility]

## 2019-12-18 ENCOUNTER — TELEPHONE (OUTPATIENT)
Dept: FAMILY MEDICINE | Facility: CLINIC | Age: 74
End: 2019-12-18

## 2019-12-18 DIAGNOSIS — E03.9 HYPOTHYROIDISM, UNSPECIFIED TYPE: Primary | ICD-10-CM

## 2019-12-18 LAB
T4 FREE SERPL-MCNC: 1.38 NG/DL (ref 0.76–1.46)
TSH SERPL DL<=0.005 MIU/L-ACNC: 0.05 MU/L (ref 0.4–4)

## 2019-12-18 RX ORDER — LEVOTHYROXINE SODIUM 100 UG/1
100 TABLET ORAL DAILY
Qty: 90 TABLET | Refills: 1 | Status: SHIPPED | OUTPATIENT
Start: 2019-12-18 | End: 2020-06-09

## 2019-12-18 NOTE — TELEPHONE ENCOUNTER
Your TSH value is low (goal 0.4-2.5) suggesting that you are getting too much thyroid hormone replacement. We should cut back on your dosage slightly. I sent to your pharmacy a prescription for 0.100mg daily. Recheck in 6-8 weeks.     I will have my nurse try to call you with the results to make sure you get the message.    The b12 level is a little high (certainly not low) but not concernng.     Amna Salamanca MD

## 2020-02-10 ENCOUNTER — HEALTH MAINTENANCE LETTER (OUTPATIENT)
Age: 75
End: 2020-02-10

## 2020-03-06 ENCOUNTER — TRANSFERRED RECORDS (OUTPATIENT)
Dept: HEALTH INFORMATION MANAGEMENT | Facility: CLINIC | Age: 75
End: 2020-03-06

## 2020-03-06 LAB
ALBUMIN (URINE) MG/SPEC: 0.7 MG/DL
ALBUMIN/CREATININE RATIO: 6 MG/G (ref 0–30)
ALT SERPL-CCNC: 14 IU/L (ref 7–52)
AST SERPL-CCNC: 16 U/L (ref 13–39)
CHOLEST SERPL-MCNC: 139 MG/DL
CREAT SERPL-MCNC: 0.6 MG/DL (ref 0.6–1.2)
CREATININE (URINE): 117.6 MG/DL
GFR SERPL CREATININE-BSD FRML MDRD: 88.82 ML/MIN/1.73M2
GLUCOSE SERPL-MCNC: 100 MG/DL (ref 70–105)
HBA1C MFR BLD: 5.1 % (ref 4–6)
HDLC SERPL-MCNC: 56 MG/DL (ref 23–92)
LDLC SERPL CALC-MCNC: 65 MG/DL
NONHDLC SERPL-MCNC: 83 MG/DL
POTASSIUM SERPL-SCNC: 3.8 MMOL/L (ref 3.5–5.1)
TRIGL SERPL-MCNC: 90 MG/DL
TSH SERPL-ACNC: 0.28 UIU/ML (ref 0.45–5.33)

## 2020-06-09 ENCOUNTER — VIRTUAL VISIT (OUTPATIENT)
Dept: FAMILY MEDICINE | Facility: CLINIC | Age: 75
End: 2020-06-09
Payer: MEDICARE

## 2020-06-09 DIAGNOSIS — Z87.440 H/O RECURRENT URINARY TRACT INFECTION: ICD-10-CM

## 2020-06-09 DIAGNOSIS — F33.41 RECURRENT MAJOR DEPRESSION IN PARTIAL REMISSION (H): ICD-10-CM

## 2020-06-09 DIAGNOSIS — I10 HYPERTENSION GOAL BP (BLOOD PRESSURE) < 140/90: ICD-10-CM

## 2020-06-09 DIAGNOSIS — N95.2 ATROPHIC VAGINITIS: ICD-10-CM

## 2020-06-09 DIAGNOSIS — E03.9 HYPOTHYROIDISM, UNSPECIFIED TYPE: ICD-10-CM

## 2020-06-09 DIAGNOSIS — E74.39 GLUCOSE INTOLERANCE: ICD-10-CM

## 2020-06-09 PROCEDURE — 99442 ZZC PHYSICIAN TELEPHONE EVALUATION 11-20 MIN: CPT | Performed by: NURSE PRACTITIONER

## 2020-06-09 RX ORDER — CITALOPRAM HYDROBROMIDE 10 MG/1
10 TABLET ORAL DAILY
Qty: 90 TABLET | Refills: 3 | Status: SHIPPED | OUTPATIENT
Start: 2020-06-09

## 2020-06-09 RX ORDER — CHLORTHALIDONE 25 MG/1
TABLET ORAL
Qty: 45 TABLET | Refills: 3 | Status: SHIPPED | OUTPATIENT
Start: 2020-06-09

## 2020-06-09 RX ORDER — LEVOTHYROXINE SODIUM 100 UG/1
100 TABLET ORAL DAILY
Qty: 90 TABLET | Refills: 3 | Status: SHIPPED | OUTPATIENT
Start: 2020-06-09

## 2020-06-09 RX ORDER — SULFAMETHOXAZOLE/TRIMETHOPRIM 800-160 MG
1 TABLET ORAL 2 TIMES DAILY
Qty: 6 TABLET | Refills: 3 | Status: SHIPPED | OUTPATIENT
Start: 2020-06-09 | End: 2020-08-03

## 2020-06-09 ASSESSMENT — PATIENT HEALTH QUESTIONNAIRE - PHQ9: SUM OF ALL RESPONSES TO PHQ QUESTIONS 1-9: 1

## 2020-06-09 NOTE — PROGRESS NOTES
"Miladis Singh is a 74 year old female who is being evaluated via a billable telephone visit.      The patient has been notified of following:     \"This telephone visit will be conducted via a call between you and your physician/provider. We have found that certain health care needs can be provided without the need for a physical exam.  This service lets us provide the care you need with a short phone conversation.  If a prescription is necessary we can send it directly to your pharmacy.  If lab work is needed we can place an order for that and you can then stop by our lab to have the test done at a later time.    Telephone visits are billed at different rates depending on your insurance coverage. During this emergency period, for some insurers they may be billed the same as an in-person visit.  Please reach out to your insurance provider with any questions.    If during the course of the call the physician/provider feels a telephone visit is not appropriate, you will not be charged for this service.\"    Patient has given verbal consent for Telephone visit?  Yes    What phone number would you like to be contacted at? [phone]     How would you like to obtain your AVS? Geovannahart    Subjective     Miladis Singh is a 74 year old female who presents via phone visit today for the following health issues:    HPI    Chief Complaint   Patient presents with     Refill Request     Diabetes       By telephone visit today, Miladis reports \"I am in pretty good shape.\"  Social distancing/staying at home due to coronavirus pandemic.    Miladis lives in [place] in the summer.  She lives in [place] in the winter, January-May.    Labs were done 3/2020 in [place].  \"Everything was really good. I got a good report.\"  A1c was low, 5.1.  Thyroid levels were \"good\" and no medication adjustments were done.  [place], Dr. Eddy, prescribes her cozaar, atorvastatin, metformin.    Dr. Salamanca manages Chlorthalidone, citalopram, " "thyroid medication, estrogen vaginal cream, bactrim DS for intermittent bladder infections, etc.  Dr. Salamanca recently lost primary care and she is establishing care for a new PCP and refills today.    Bactrim DS:  In the past year, she has taken it 3 times for bladder symptoms.  \"My last bladder infection was a long time ago.\"  She denies any active bladder symptoms today.  She is not sexually active.    She denies chest pain or shortness of breath.  Her energy level is good.      Patient Active Problem List   Diagnosis     Acquired hypothyroidism     Essential hypertension     Hyperlipidemia     Screening for malignant neoplasm of colon     Overactive bladder     Gastroesophageal reflux disease     Major depressive disorder, recurrent, in remission (H)     Atherosclerotic heart disease of native coronary artery without angina pectoris     Depressive disorder     GERD with esophagitis     Hypokalemia     Metabolic syndrome     Postmenopausal hormone replacement therapy     Pure hypercholesterolemia     Undiagnosed cardiac murmurs     Vitamin D deficiency     History reviewed. No pertinent surgical history.    Social History     Tobacco Use     Smoking status: Former Smoker     Packs/day: 1.50     Years: 5.00     Pack years: 7.50     Types: Cigarettes     Last attempt to quit: 1970     Years since quittin.0     Smokeless tobacco: Never Used   Substance Use Topics     Alcohol use: Yes     Comment: wine     Family History   Problem Relation Age of Onset     C.A.D. Father      Heart Disease Father         MI      Hypertension Father      Cancer Father         lung     Cerebrovascular Disease Father      C.A.D. Mother      Hypertension Mother      Arthritis Mother      Eye Disorder Mother         cateracts     Lipids Mother      Thyroid Disease Mother      Diabetes Paternal Grandfather         type 2     C.A.D. Brother      Diabetes Son         type 1     Hypertension Brother      Circulatory Brother         DVT "     Congenital Anomalies Brother          age 15     Lipids Brother          Current Outpatient Medications   Medication Sig Dispense Refill     atorvastatin (LIPITOR) 80 MG tablet Take 80 mg by mouth daily.       calcium-vitamin D-vitamin k (VIACTIV) 500-100-40 chewable tablet Take 1 chew tab by mouth 2 times daily.       chlorthalidone (HYGROTON) 25 MG tablet 12.5 mg daily 45 tablet 3     citalopram (CELEXA) 10 MG tablet Take 1 tablet (10 mg) by mouth daily 90 tablet 3     [START ON 2020] conjugated estrogens (PREMARIN) 0.625 MG/GM vaginal cream Place 0.5 g vaginally twice a week 30 g 11     levothyroxine (SYNTHROID/LEVOTHROID) 100 MCG tablet Take 1 tablet (100 mcg) by mouth daily 90 tablet 3     losartan (COZAAR) 100 MG tablet Take 100 mg by mouth daily.       metFORMIN (GLUCOPHAGE) 500 MG tablet Take 1 tablet (500 mg) by mouth 2 times daily (with meals) 180 tablet 3     Multiple Vitamins-Calcium (TGT DAILY MULTIVITAMIN WOMENS PO) Take  by mouth.       sulfamethoxazole-trimethoprim (BACTRIM DS) 800-160 MG tablet Take 1 tablet by mouth 2 times daily To take with the signs of bladder infection. 6 tablet 3     VITAMIN D, CHOLECALCIFEROL, PO Take 2,000 Units by mouth daily       fluticasone (FLONASE) 50 MCG/ACT spray Spray 1-2 sprays into both nostrils daily (Patient not taking: Reported on 2019) 1 Bottle 0     omeprazole (PRILOSEC) 20 MG CR capsule Take 1 capsule (20 mg) by mouth 2 times daily as needed (Patient not taking: Reported on 2020) 180 capsule 3     No Known Allergies  Recent Labs   Lab Test 19  1426 19  1154 19  1520 19  1433 18  1529  12  0842   A1C 5.0  --   --   --   --   --   --   --   --  5.8  --  5.7   LDL  --   --   --   --  49 41  --  58   < >  --    < >  --    HDL  --   --   --   --  44 47  --  44   < >  --    < >  --    TRIG  --   --   --   --  130 168*  --  152*   < >  --    < >  --    ALT  --   --  33  --  37 46   --   --    < >  --    < >  --    CR  --   --  0.47* 0.54 0.63 0.6   < >  --    < >  --    < >  --    GFRESTIMATED  --   --  >90 >90 88.65 >=60   < >  --    < >  --    < >  --    GFRESTBLACK  --   --  >90 >90  --  >=60   < >  --    < >  --    < >  --    POTASSIUM  --   --  4.0 4.2 3.9 3.9   < >  --    < >  --    < >  --    TSH 0.05* <0.01* <0.01* 0.02* 3.560 2.990  --   --    < > 11.50*  --   --     < > = values in this interval not displayed.      BP Readings from Last 3 Encounters:   12/17/19 132/86   07/23/19 122/78   06/25/19 146/84    Wt Readings from Last 3 Encounters:   12/17/19 68.5 kg (151 lb)   07/23/19 65.8 kg (145 lb)   06/25/19 66.8 kg (147 lb 3.2 oz)              Reviewed and updated as needed this visit by Provider  Tobacco  Allergies  Meds  Problems  Med Hx  Surg Hx  Fam Hx         Review of Systems   Constitutional, HEENT, cardiovascular, pulmonary, GI, , musculoskeletal, neuro, skin, endocrine and psych systems are negative, except as otherwise noted.       Objective   Reported vitals:  There were no vitals taken for this visit.   healthy, alert and no distress  PSYCH: Alert and oriented times 3; coherent speech, normal   rate and volume, able to articulate logical thoughts, able   to abstract reason, no tangential thoughts, no hallucinations   or delusions  Her affect is normal  RESP: No cough, no audible wheezing, able to talk in full sentences  Remainder of exam unable to be completed due to telephone visits    Diagnostic Test Results:  Labs reviewed in Epic        Assessment/Plan:  (I10) Hypertension goal BP (blood pressure) < 140/90  Comment: Chronic  Plan: chlorthalidone (HYGROTON) 25 MG tablet        For today, I did go ahead and refill her chlorthalidone.  She did have labs done in March and everything look good.  Yearly clinic appointment for this refill, otherwise she can have these refills done by her March provider  She is appreciative.    (Z87.440) H/O recurrent urinary tract  infection  Comment: chronic  Plan: sulfamethoxazole-trimethoprim (BACTRIM DS)         800-160 MG tablet        I did discuss with her taking her medication intermittently for bladder symptoms.  Drinking fluids, urinating frequently, with back etc.  In the event that her antibiotics are not helpful or she has UTI symptoms, she is to be evaluated.  I did tell her if she has more than 3 UTIs in 1 year she should see urology.  She agrees and understands    (N95.2) Atrophic vaginitis  Comment: Chronic  Plan: conjugated estrogens (PREMARIN) 0.625 MG/GM         vaginal cream        Refills given    (F33.41) Recurrent Major Depression in Partial Remission-Yudy Deactivated  Comment: Chronic/controlled  Plan: citalopram (CELEXA) 10 MG tablet        Refills given    (E03.9) Hypothyroidism, unspecified type  Comment: Chronic/controlled  Plan: levothyroxine (SYNTHROID/LEVOTHROID) 100 MCG         tablet        I did review her labs from March and her TSH was slightly low, but she was told by Dr. Eddy to continue her current medication.  I did go ahead and refill her medication today.  She is going to come into the clinic to see me this fall for her physical with lab panel.  I will repeat her TSH at that time.    (E74.39) Glucose intolerance  Comment: History of  Plan:          Return in about 4 months (around 10/9/2020) for Physical Exam, Medication follow up.      Phone call duration:  17 minutes    NINA Davison CNP

## 2020-08-03 ENCOUNTER — OFFICE VISIT (OUTPATIENT)
Dept: FAMILY MEDICINE | Facility: CLINIC | Age: 75
End: 2020-08-03
Payer: MEDICARE

## 2020-08-03 VITALS
WEIGHT: 148.8 LBS | HEIGHT: 63 IN | DIASTOLIC BLOOD PRESSURE: 72 MMHG | SYSTOLIC BLOOD PRESSURE: 110 MMHG | BODY MASS INDEX: 26.36 KG/M2 | TEMPERATURE: 97.8 F

## 2020-08-03 DIAGNOSIS — I10 HYPERTENSION GOAL BP (BLOOD PRESSURE) < 140/90: ICD-10-CM

## 2020-08-03 DIAGNOSIS — H25.89 OTHER AGE-RELATED CATARACT OF LEFT EYE: ICD-10-CM

## 2020-08-03 DIAGNOSIS — Z01.818 PREOP GENERAL PHYSICAL EXAM: Primary | ICD-10-CM

## 2020-08-03 PROCEDURE — 99214 OFFICE O/P EST MOD 30 MIN: CPT | Performed by: FAMILY MEDICINE

## 2020-08-03 ASSESSMENT — MIFFLIN-ST. JEOR: SCORE: 1129.08

## 2020-08-03 NOTE — PROGRESS NOTES
[facility] [address] [place] [address]  [phone]  Dept: [phone]    PRE-OP EVALUATION:  Today's date: 8/3/2020    Miladis Singh (: 1945) presents for pre-operative evaluation assessment as requested by Dr. Felipe pacheco .  She requires evaluation and anesthesia risk assessment prior to undergoing surgery/procedure for treatment of cataract of left eye   .    Proposed Surgery/ Procedure: catataract   Date of Surgery/ Procedure: 2020  Time of Surgery/ Procedure: 9:30am   Hospital/Surgical Facility: [facility] eye surgery   Surgery Fax Number: [phone]  Primary Physician: Aman Salamanca  Type of Anesthesia Anticipated: Local    Preoperative Questionnaire:   No - Have you ever had a heart attack or stroke?  No - Have you ever had surgery on your heart or blood vessels, such as a stent, coronary (heart) bypass, or surgery on an artery in the head, neck, heart, or legs?  No - Do you have chest pain when you are physically active?  No - Do you have a history of heart failure?  No - Do you currently have a cold, bronchitis, or symptoms of other respiratory (head and chest) infections?  No - Do you have a cough, shortness of breath, or wheezing?  No - Do you or anyone in your family have a history of blood clots?  No - Do you or anyone in your family have a serious bleeding problem, such as long-lasting bleeding after surgeries or cuts?  No - Have you ever had anemia or been told to take iron pills?  No - Have you had any abnormal blood loss such as black, tarry or bloody stools, or abnormal vaginal bleeding?  No - Have you ever had a blood transfusion?  Yes - Are you willing to have a blood transfusion if it is medically needed before, during, or after your surgery?  No - Have you or anyone in your family ever had problems with anesthesia (sedation for surgery)?  No - Do you have sleep apnea, excessive snoring, or daytime drowsiness?   No - Do you have any  artifical heart valves or other implanted medical devices, such as a pacemaker, defibrillator, or continuous glucose monitor?  No - Do you have any artifical joints?  No - Are you allergic to latex?  No - Is there any chance that you may be pregnant?    Patient has a Health Care Directive or Living Will:  NO    HPI:     HPI related to upcoming procedure: Left eye cataract        DEPRESSION - Patient has a long history of Depression of moderate severity requiring medication for control with recent symptoms being stable..Current symptoms of depression include none.     HYPERLIPIDEMIA - Patient has a long history of significant Hyperlipidemia requiring medication for treatment with recent good control. Patient reports no problems or side effects with the medication.     HYPERTENSION - Patient has longstanding history of HTN , currently denies any symptoms referable to elevated blood pressure. Specifically denies chest pain, palpitations, dyspnea, orthopnea, PND or peripheral edema. Blood pressure readings have been in normal range. Current medication regimen is as listed below. Patient denies any side effects of medication.     HYPOTHYROIDISM - Patient has a longstanding history of chronic Hypothyroidism. Patient has been doing well, noting no tremor, insomnia, hair loss or changes in skin texture. Continues to take medications as directed, without adverse reactions or side effects. Last TSH   Lab Results   Component Value Date    TSH 0.05 (L) 12/17/2019   .        MEDICAL HISTORY:     Patient Active Problem List    Diagnosis Date Noted     Hypokalemia 03/12/2019     Priority: Medium     Atherosclerotic heart disease of native coronary artery without angina pectoris 04/26/2018     Priority: Medium     GERD with esophagitis 04/26/2018     Priority: Medium     Metabolic syndrome 04/26/2018     Priority: Medium     Pure hypercholesterolemia 04/26/2018     Priority: Medium     Undiagnosed cardiac murmurs 04/26/2018      Priority: Medium     Vitamin D deficiency 02/23/2016     Priority: Medium     Major depressive disorder, recurrent, in remission (H) 06/30/2015     Priority: Medium     Overactive bladder 07/09/2013     Priority: Medium     Gastroesophageal reflux disease 02/12/2013     Priority: Medium     Per provider         Screening for malignant neoplasm of colon 07/01/2011     Priority: Medium     Advance Directive Problem List Overview:   Name Relationship Phone    Primary Health Care Agent            Alternative Health Care Agent          Discussed advance care planning with patient; information given to patient to review. 7/1/2011          Hyperlipidemia 04/19/2011     Priority: Medium     Essential hypertension 12/28/2010     Priority: Medium     Depressive disorder 09/25/2006     Priority: Medium     Acquired hypothyroidism 01/01/1999     Priority: Medium     Problem list name updated by automated process. Provider to review    Overview:   decrease to 100mcg.       Postmenopausal hormone replacement therapy 08/01/1994     Priority: Medium      Past Medical History:   Diagnosis Date     Hyperlipidemia LDL goal <130 4/19/2011     Hypertension goal BP (blood pressure) < 140/90 12/28/2010     Unspecified hypothyroidism      History reviewed. No pertinent surgical history.  Current Outpatient Medications   Medication Sig Dispense Refill     atorvastatin (LIPITOR) 80 MG tablet Take 80 mg by mouth daily.       calcium-vitamin D-vitamin k (VIACTIV) 500-100-40 chewable tablet Take 1 chew tab by mouth 2 times daily.       chlorthalidone (HYGROTON) 25 MG tablet 12.5 mg daily 45 tablet 3     citalopram (CELEXA) 10 MG tablet Take 1 tablet (10 mg) by mouth daily 90 tablet 3     conjugated estrogens (PREMARIN) 0.625 MG/GM vaginal cream Place 0.5 g vaginally twice a week 30 g 11     levothyroxine (SYNTHROID/LEVOTHROID) 100 MCG tablet Take 1 tablet (100 mcg) by mouth daily 90 tablet 3     losartan (COZAAR) 100 MG tablet Take 100 mg by  "mouth daily.       metFORMIN (GLUCOPHAGE) 500 MG tablet Take 1 tablet (500 mg) by mouth 2 times daily (with meals) 180 tablet 3     Multiple Vitamins-Calcium (TGT DAILY MULTIVITAMIN WOMENS PO) Take  by mouth.       omeprazole (PRILOSEC) 20 MG CR capsule Take 1 capsule (20 mg) by mouth 2 times daily as needed 180 capsule 3     VITAMIN D, CHOLECALCIFEROL, PO Take 2,000 Units by mouth daily       OTC products: None, except as noted above, no recent use of OTC ASA, NSAIDS or Steroids and no use of herbal medications or other supplements    No Known Allergies   Latex Allergy: NO    Social History     Tobacco Use     Smoking status: Former Smoker     Packs/day: 1.50     Years: 5.00     Pack years: 7.50     Types: Cigarettes     Last attempt to quit: 1970     Years since quittin.2     Smokeless tobacco: Never Used   Substance Use Topics     Alcohol use: Yes     Comment: wine     History   Drug Use No       REVIEW OF SYSTEMS:   CONSTITUTIONAL: NEGATIVE for fever, chills, change in weight  ENT/MOUTH: NEGATIVE for ear, mouth and throat problems  RESP: NEGATIVE for significant cough or SOB  CV: NEGATIVE for chest pain, palpitations or peripheral edema    EXAM:   /72   Temp 97.8  F (36.6  C) (Oral)   Ht 1.6 m (5' 3\")   Wt 67.5 kg (148 lb 12.8 oz)   BMI 26.36 kg/m    GENERAL APPEARANCE: healthy, alert and no distress  HENT: ear canals and TM's normal and nose and mouth without ulcers or lesions  RESP: lungs clear to auscultation - no rales, rhonchi or wheezes  CV: regular rate and rhythm, normal S1 S2, no S3 or S4 and no murmur, click or rub   ABDOMEN: soft, nontender, no HSM or masses and bowel sounds normal  NEURO: Normal strength and tone, sensory exam grossly normal, mentation intact and speech normal    DIAGNOSTICS:   No labs or EKG required for low risk surgery (cataract, skin procedure, breast biopsy, etc)    Recent Labs   Lab Test 19  1426 19  1520 19  1433  13  1529   NA  --  " 136 141   < >  --    POTASSIUM  --  4.0 4.2   < >  --    CR  --  0.47* 0.54   < >  --    A1C 5.0  --   --   --  5.8    < > = values in this interval not displayed.        IMPRESSION:   Reason for surgery/procedure: Left eye cataract  Diagnosis/reason for consult: Pre-op for above    The proposed surgical procedure is considered LOW risk.    REVISED CARDIAC RISK INDEX  The patient has the following serious cardiovascular risks for perioperative complications such as (MI, PE, VFib and 3  AV Block):  No serious cardiac risks  INTERPRETATION: 0 risks: Class I (very low risk - 0.4% complication rate)    The patient has the following additional risks for perioperative complications:  No identified additional risks      ICD-10-CM    1. Preop general physical exam  Z01.818    2. Other age-related cataract of left eye  H25.89        RECOMMENDATIONS:           ACE Inhibitor or Angiotensin Receptor Blocker (ARB) Use  Ace inhibitor or Angiotensin Receptor Blocker (ARB) and will continue this medication due to the higher risk of uncontrolled perioperative hypertension (e.g. neurosurgical procedure)      APPROVAL GIVEN to proceed with proposed procedure, without further diagnostic evaluation       Signed Electronically by: Smitha Noel MD    Copy of this evaluation report is provided to requesting physician.    [facility] Preop Guidelines    Revised Cardiac Risk Index

## 2020-08-06 DIAGNOSIS — I10 HYPERTENSION GOAL BP (BLOOD PRESSURE) < 140/90: ICD-10-CM

## 2020-08-06 RX ORDER — CHLORTHALIDONE 25 MG/1
TABLET ORAL
Qty: 45 TABLET | Refills: 3 | OUTPATIENT
Start: 2020-08-06

## 2020-08-10 RX ORDER — CHLORTHALIDONE 25 MG/1
TABLET ORAL
Qty: 45 TABLET | Refills: 3 | OUTPATIENT
Start: 2020-08-10

## 2020-08-10 NOTE — TELEPHONE ENCOUNTER
For chlorthalidone Message sent to pharmacy - Refusal reason: Should already have refills on file (SEE ORDER SENT 6/9/2020 TO (requesting) [facility] FOR 1 YR SUPPLY.) .  Gretchen LOYD

## 2020-11-16 ENCOUNTER — HEALTH MAINTENANCE LETTER (OUTPATIENT)
Age: 75
End: 2020-11-16

## 2021-04-04 ENCOUNTER — HEALTH MAINTENANCE LETTER (OUTPATIENT)
Age: 76
End: 2021-04-04

## 2021-05-18 ENCOUNTER — TRANSFERRED RECORDS (OUTPATIENT)
Dept: HEALTH INFORMATION MANAGEMENT | Facility: CLINIC | Age: 76
End: 2021-05-18

## 2021-09-18 ENCOUNTER — HEALTH MAINTENANCE LETTER (OUTPATIENT)
Age: 76
End: 2021-09-18

## 2022-04-30 ENCOUNTER — HEALTH MAINTENANCE LETTER (OUTPATIENT)
Age: 77
End: 2022-04-30

## 2022-11-20 ENCOUNTER — HEALTH MAINTENANCE LETTER (OUTPATIENT)
Age: 77
End: 2022-11-20